# Patient Record
Sex: MALE | Race: WHITE | NOT HISPANIC OR LATINO | Employment: OTHER | URBAN - METROPOLITAN AREA
[De-identification: names, ages, dates, MRNs, and addresses within clinical notes are randomized per-mention and may not be internally consistent; named-entity substitution may affect disease eponyms.]

---

## 2017-12-19 RX ORDER — DABIGATRAN ETEXILATE MESYLATE 150 MG/1
CAPSULE ORAL
Refills: 3 | COMMUNITY
Start: 2017-11-13 | End: 2019-10-28

## 2017-12-19 RX ORDER — SOTALOL HYDROCHLORIDE 120 MG/1
TABLET ORAL
Refills: 1 | COMMUNITY
Start: 2017-11-13 | End: 2020-03-02 | Stop reason: SDUPTHER

## 2017-12-19 RX ORDER — LEVOCETIRIZINE DIHYDROCHLORIDE 5 MG/1
TABLET, FILM COATED ORAL
Refills: 1 | COMMUNITY
Start: 2017-11-13 | End: 2019-09-16

## 2017-12-19 RX ORDER — DABIGATRAN ETEXILATE MESYLATE 150 MG/1
CAPSULE ORAL
Qty: 60 CAPSULE | Refills: 3 | Status: CANCELLED | OUTPATIENT
Start: 2017-12-19

## 2017-12-19 RX ORDER — TAMSULOSIN HYDROCHLORIDE 0.4 MG/1
CAPSULE ORAL
Refills: 1 | COMMUNITY
Start: 2017-09-15

## 2017-12-19 RX ORDER — POLYETHYLENE GLYCOL 3350 17 G/17G
POWDER, FOR SOLUTION ORAL
Refills: 1 | COMMUNITY
Start: 2017-11-13 | End: 2019-09-16

## 2017-12-27 RX ORDER — DABIGATRAN ETEXILATE MESYLATE 150 MG/1
CAPSULE ORAL
Refills: 3 | Status: CANCELLED | OUTPATIENT
Start: 2017-12-27

## 2019-09-16 ENCOUNTER — OFFICE VISIT (OUTPATIENT)
Dept: CARDIOLOGY | Facility: CLINIC | Age: 69
End: 2019-09-16
Payer: MEDICARE

## 2019-09-16 VITALS
HEIGHT: 71 IN | WEIGHT: 270.06 LBS | BODY MASS INDEX: 37.81 KG/M2 | SYSTOLIC BLOOD PRESSURE: 138 MMHG | DIASTOLIC BLOOD PRESSURE: 84 MMHG | HEART RATE: 63 BPM

## 2019-09-16 DIAGNOSIS — I73.9 CLAUDICATION IN PERIPHERAL VASCULAR DISEASE: ICD-10-CM

## 2019-09-16 DIAGNOSIS — Z79.01 LONG TERM (CURRENT) USE OF ANTICOAGULANTS: ICD-10-CM

## 2019-09-16 DIAGNOSIS — Z79.899 LONG TERM CURRENT USE OF ANTIARRHYTHMIC DRUG: ICD-10-CM

## 2019-09-16 DIAGNOSIS — E66.01 SEVERE OBESITY (BMI 35.0-39.9) WITH COMORBIDITY: ICD-10-CM

## 2019-09-16 DIAGNOSIS — R60.0 LOWER LEG EDEMA: ICD-10-CM

## 2019-09-16 DIAGNOSIS — R06.02 SOB (SHORTNESS OF BREATH): ICD-10-CM

## 2019-09-16 DIAGNOSIS — I48.0 PAF (PAROXYSMAL ATRIAL FIBRILLATION): Primary | ICD-10-CM

## 2019-09-16 DIAGNOSIS — I10 ESSENTIAL HYPERTENSION: ICD-10-CM

## 2019-09-16 DIAGNOSIS — I34.0 NON-RHEUMATIC MITRAL REGURGITATION: ICD-10-CM

## 2019-09-16 PROCEDURE — 93000 ELECTROCARDIOGRAM COMPLETE: CPT | Mod: S$GLB,,, | Performed by: INTERNAL MEDICINE

## 2019-09-16 PROCEDURE — 99204 OFFICE O/P NEW MOD 45 MIN: CPT | Mod: S$GLB,,, | Performed by: INTERNAL MEDICINE

## 2019-09-16 PROCEDURE — 93000 EKG 12-LEAD: ICD-10-PCS | Mod: S$GLB,,, | Performed by: INTERNAL MEDICINE

## 2019-09-16 PROCEDURE — 99999 PR PBB SHADOW E&M-EST. PATIENT-LVL III: ICD-10-PCS | Mod: PBBFAC,,, | Performed by: INTERNAL MEDICINE

## 2019-09-16 PROCEDURE — 99204 PR OFFICE/OUTPT VISIT, NEW, LEVL IV, 45-59 MIN: ICD-10-PCS | Mod: S$GLB,,, | Performed by: INTERNAL MEDICINE

## 2019-09-16 PROCEDURE — 99999 PR PBB SHADOW E&M-EST. PATIENT-LVL III: CPT | Mod: PBBFAC,,, | Performed by: INTERNAL MEDICINE

## 2019-09-16 RX ORDER — FUROSEMIDE 20 MG/1
TABLET ORAL
Refills: 0 | COMMUNITY
Start: 2019-08-09 | End: 2019-09-16 | Stop reason: DRUGHIGH

## 2019-09-16 RX ORDER — POTASSIUM CHLORIDE 750 MG/1
10 TABLET, EXTENDED RELEASE ORAL DAILY
Qty: 30 TABLET | Refills: 1 | Status: SHIPPED | OUTPATIENT
Start: 2019-09-16 | End: 2019-09-16 | Stop reason: SDUPTHER

## 2019-09-16 RX ORDER — FUROSEMIDE 40 MG/1
40 TABLET ORAL DAILY
Qty: 30 TABLET | Refills: 1 | Status: SHIPPED | OUTPATIENT
Start: 2019-09-16 | End: 2019-09-16 | Stop reason: SDUPTHER

## 2019-09-16 RX ORDER — POTASSIUM CHLORIDE 750 MG/1
TABLET, EXTENDED RELEASE ORAL
Qty: 90 TABLET | Refills: 0 | Status: SHIPPED | OUTPATIENT
Start: 2019-09-16 | End: 2019-10-28 | Stop reason: DRUGHIGH

## 2019-09-16 RX ORDER — CLOTRIMAZOLE 1 %
CREAM (GRAM) TOPICAL
Refills: 2 | COMMUNITY
Start: 2019-08-09 | End: 2022-06-02

## 2019-09-16 RX ORDER — FUROSEMIDE 40 MG/1
TABLET ORAL
Qty: 90 TABLET | Refills: 0 | Status: SHIPPED | OUTPATIENT
Start: 2019-09-16 | End: 2020-03-02 | Stop reason: SDUPTHER

## 2019-09-16 RX ORDER — CLINDAMYCIN HYDROCHLORIDE 300 MG/1
CAPSULE ORAL
Refills: 0 | COMMUNITY
Start: 2019-09-11 | End: 2022-06-02

## 2019-09-16 NOTE — PROGRESS NOTES
Subjective:    Patient ID:  Yonathan Hernandez Sr. is a 68 y.o. male who presents for Atrial Fibrillation; Hypertension; and Edema        Atrial Fibrillation   Presents for follow-up visit. Symptoms include hypertension and shortness of breath. Symptoms are negative for chest pain, dizziness, palpitations, syncope and weakness. The symptoms have been stable. Past medical history includes atrial fibrillation.   Hypertension   This is a chronic problem. The current episode started more than 1 year ago. The problem is controlled. Associated symptoms include shortness of breath. Pertinent negatives include no blurred vision, chest pain, malaise/fatigue, orthopnea, palpitations or PND. Risk factors for coronary artery disease include male gender and obesity. Past treatments include diuretics.   Edema   This is a recurrent problem. The current episode started more than 1 month ago. The problem has been waxing and waning. Pertinent negatives include no abdominal pain, change in bowel habit, chest pain, chills, congestion, coughing, diaphoresis, fever, numbness, vomiting or weakness. He has tried walking for the symptoms. The treatment provided mild relief.   Shortness of Breath   This is a chronic problem. The current episode started more than 1 year ago. Associated symptoms include claudication and leg swelling. Pertinent negatives include no abdominal pain, chest pain, fever, hemoptysis, orthopnea, PND, syncope, vomiting or wheezing. The symptoms are aggravated by exercise. He has tried rest for the symptoms. The treatment provided mild relief.       NEW PT EVALUATION, PAF, HAD VENOUS DOPPLER FOR EDEMA, WAS OK,HAS BEEN TREATED FOR CELLULITIS, FOR A WHILE , 7/30/2019 ,HAS BEEN WEARING STOCKINGS, WAS FOLLOWED BY DR AWAD,  SEE ROS  Past Medical History:   Diagnosis Date    Edema      Past Surgical History:   Procedure Laterality Date    CARDIAC CATHETERIZATION       Family History   Problem Relation Age of Onset     Lung cancer Father      Social History     Socioeconomic History    Marital status:      Spouse name: Not on file    Number of children: Not on file    Years of education: Not on file    Highest education level: Not on file   Occupational History    Not on file   Social Needs    Financial resource strain: Not on file    Food insecurity:     Worry: Not on file     Inability: Not on file    Transportation needs:     Medical: Not on file     Non-medical: Not on file   Tobacco Use    Smoking status: Never Smoker    Smokeless tobacco: Never Used   Substance and Sexual Activity    Alcohol use: Not Currently    Drug use: Not Currently    Sexual activity: Not on file   Lifestyle    Physical activity:     Days per week: Not on file     Minutes per session: Not on file    Stress: Not on file   Relationships    Social connections:     Talks on phone: Not on file     Gets together: Not on file     Attends Tenriism service: Not on file     Active member of club or organization: Not on file     Attends meetings of clubs or organizations: Not on file     Relationship status: Not on file   Other Topics Concern    Not on file   Social History Narrative    Not on file       Review of patient's allergies indicates:   Allergen Reactions    Lidocaine hcl        Current Outpatient Medications:     clindamycin (CLEOCIN) 300 MG capsule, TK ONE C PO BID FOR 14 DAYS, Disp: , Rfl: 0    clotrimazole (LOTRIMIN) 1 % cream, , Disp: , Rfl: 2    PRADAXA 150 mg Cap, TK ONE C PO ONCE A DAY, Disp: , Rfl: 3    sotalol (BETAPACE) 120 MG Tab, TK 1 T PO Q 12 H, Disp: , Rfl: 1    tamsulosin (FLOMAX) 0.4 mg Cp24, TK 1 C PO QD, Disp: , Rfl: 1    furosemide (LASIX) 40 MG tablet, TAKE 1 TABLET(40 MG) BY MOUTH EVERY DAY, Disp: 90 tablet, Rfl: 0    potassium chloride SA (K-DUR,KLOR-CON) 10 MEQ tablet, TAKE 1 TABLET(10 MEQ) BY MOUTH EVERY DAY, Disp: 90 tablet, Rfl: 0    Review of Systems   Constitution: Negative for chills,  "diaphoresis, fever, malaise/fatigue, night sweats and weight gain.   HENT: Negative for congestion, hearing loss and nosebleeds.    Eyes: Negative for blurred vision and visual disturbance.   Cardiovascular: Positive for claudication, dyspnea on exertion and leg swelling. Negative for chest pain, cyanosis, irregular heartbeat, near-syncope, orthopnea, palpitations, paroxysmal nocturnal dyspnea and syncope.   Respiratory: Positive for shortness of breath. Negative for cough, hemoptysis and wheezing.    Endocrine: Negative for cold intolerance, heat intolerance and polyuria.   Hematologic/Lymphatic: Negative for adenopathy. Does not bruise/bleed easily.   Skin: Negative for color change, itching and nail changes.   Musculoskeletal: Negative for back pain (CHRONIC, HAD INJECTION, DR VALERIO), falls, joint pain and stiffness.   Gastrointestinal: Negative for abdominal pain, change in bowel habit, dysphagia, flatus, heartburn, hematemesis, jaundice, melena and vomiting.   Genitourinary: Negative for dysuria, flank pain and frequency.   Neurological: Negative for brief paralysis, dizziness, focal weakness, light-headedness, loss of balance, numbness, paresthesias, seizures, sensory change, tremors and weakness.   Psychiatric/Behavioral: Negative for altered mental status, depression, memory loss and substance abuse. The patient is not nervous/anxious.    Allergic/Immunologic: Negative for hives and persistent infections.        Objective:      Vitals:    09/16/19 1510   BP: 138/84   Pulse: 63   Weight: 122.5 kg (270 lb 1 oz)   Height: 5' 11" (1.803 m)     Body mass index is 37.67 kg/m².    Physical Exam   Constitutional: He is oriented to person, place, and time. He appears well-developed and well-nourished. He is active.   HENT:   Head: Normocephalic and atraumatic.   Mouth/Throat: Oropharynx is clear and moist and mucous membranes are normal.   Eyes: Pupils are equal, round, and reactive to light. Conjunctivae and EOM " are normal.   Neck: Normal range of motion. Neck supple. Normal carotid pulses, no hepatojugular reflux and no JVD present. Carotid bruit is not present. No tracheal deviation, no edema and no erythema present. No thyromegaly present.   Cardiovascular: Normal rate, regular rhythm and normal heart sounds.  No extrasystoles are present. PMI is not displaced. Exam reveals no gallop, no distant heart sounds, no friction rub and no midsystolic click.   No murmur heard.  Pulses:       Carotid pulses are 2+ on the right side, and 2+ on the left side.       Radial pulses are 2+ on the right side, and 2+ on the left side.        Femoral pulses are 2+ on the right side, and 2+ on the left side.       Popliteal pulses are 2+ on the right side, and 2+ on the left side.        Dorsalis pedis pulses are 2+ on the right side, and 2+ on the left side.        Posterior tibial pulses are 1+ on the right side, and 1+ on the left side.   Pulmonary/Chest: Effort normal and breath sounds normal. No accessory muscle usage. No tachypnea and no bradypnea. No respiratory distress.   Abdominal: Soft. Bowel sounds are normal. He exhibits no distension and no mass. There is no hepatosplenomegaly. There is no CVA tenderness.   Musculoskeletal: Normal range of motion. He exhibits no edema or deformity.   +1 EDEMA R> L WITH MILD ERYTHEMA   Lymphadenopathy:     He has no cervical adenopathy.   Neurological: He is alert and oriented to person, place, and time. He has normal strength. He displays no tremor. No cranial nerve deficit.   Skin: Skin is warm and dry. No cyanosis or erythema. No pallor.   Psychiatric: He has a normal mood and affect. His speech is normal and behavior is normal.               ..    Chemistry    No results found for: NA, K, CL, CO2, BUN, CREATININE, GLU No results found for: CALCIUM, ALKPHOS, AST, ALT, BILITOT, ESTGFRAFRICA, EGFRNONAA         ..No results found for: CHOL  No results found for: HDL  No results found for:  LDLCALC  No results found for: TRIG  No results found for: CHOLHDL  ..No results found for: WBC, HGB, HCT, MCV, PLT    Test(s) Reviewed  I have reviewed the following in detail:  [] Stress test   [] Angiography   [] Echocardiogram   [] Labs   [x] Other:       Assessment:         ICD-10-CM ICD-9-CM   1. PAF (paroxysmal atrial fibrillation) I48.0 427.31   2. Lower leg edema R60.0 782.3   3. SOB (shortness of breath) R06.02 786.05   4. Long term (current) use of anticoagulants Z79.01 V58.61   5. Long term current use of antiarrhythmic drug Z79.899 V58.69   6. Essential hypertension I10 401.9   7. Claudication in peripheral vascular disease I73.9 443.9   8. Severe obesity (BMI 35.0-39.9) with comorbidity E66.01 278.01   9. Non-rheumatic mitral regurgitation I34.0 424.0     Problem List Items Addressed This Visit        Cardiac/Vascular    PAF (paroxysmal atrial fibrillation) - Primary    Relevant Orders    Nuclear Stress - Cardiology Interpreted    Echo    Basic metabolic panel    Essential hypertension    Relevant Orders    Nuclear Stress - Cardiology Interpreted    Basic metabolic panel    Claudication in peripheral vascular disease    Relevant Orders    Nuclear Stress - Cardiology Interpreted    CV Ultrasound doppler arterial legs bilat    Basic metabolic panel    Non-rheumatic mitral regurgitation    Relevant Orders    Nuclear Stress - Cardiology Interpreted    Echo    Basic metabolic panel       Hematology    Long term (current) use of anticoagulants    Relevant Orders    Nuclear Stress - Cardiology Interpreted    Basic metabolic panel       Endocrine    Severe obesity (BMI 35.0-39.9) with comorbidity    Relevant Orders    Nuclear Stress - Cardiology Interpreted    Basic metabolic panel       Other    Lower leg edema    Relevant Orders    Nuclear Stress - Cardiology Interpreted    Basic metabolic panel    Long term current use of antiarrhythmic drug    Relevant Orders    Nuclear Stress - Cardiology Interpreted     Basic metabolic panel    SOB (shortness of breath)    Relevant Orders    Nuclear Stress - Cardiology Interpreted    Echo    Basic metabolic panel           Plan:     EKG SR, WNL, GET VENOUS DOPPLER AND LABS FROM PCP, , WILL NEED EVALUATION BY CHECK WHEN CLEARED STRESS EDGE ARTERIAL DOPPLER , INCREASE LASIX TO 40 MG AND ADD POTASSIUM SUPPLEMENT, CHECK BMP ON MEDICATIONS.ALL OTHER CV CLINICALLY STABLE,  NO HF, NO TIA, NO CLINICAL ARRHYTHMIA,CONTINUE CURRENT MEDS, EDUCATION, DIET, EXERCISE, WEIGHT LOSS, RETURN TO CLINIC IN 2 MONTHS, EXPLAINED PLAN TO THE PATIENT DETAILS      PAF (paroxysmal atrial fibrillation)  -     Nuclear Stress - Cardiology Interpreted; Future  -     Echo; Future  -     Basic metabolic panel; Future; Expected date: 09/16/2019    Lower leg edema  -     Nuclear Stress - Cardiology Interpreted; Future  -     Basic metabolic panel; Future; Expected date: 09/16/2019    SOB (shortness of breath)  -     Nuclear Stress - Cardiology Interpreted; Future  -     Echo; Future  -     Basic metabolic panel; Future; Expected date: 09/16/2019    Long term (current) use of anticoagulants  -     Nuclear Stress - Cardiology Interpreted; Future  -     Basic metabolic panel; Future; Expected date: 09/16/2019    Long term current use of antiarrhythmic drug  -     Nuclear Stress - Cardiology Interpreted; Future  -     Basic metabolic panel; Future; Expected date: 09/16/2019    Essential hypertension  -     Nuclear Stress - Cardiology Interpreted; Future  -     Basic metabolic panel; Future; Expected date: 09/16/2019  -     SCHEDULED EKG 12-LEAD (to Muse)    Claudication in peripheral vascular disease  -     Nuclear Stress - Cardiology Interpreted; Future  -     CV Ultrasound doppler arterial legs bilat; Future  -     Basic metabolic panel; Future; Expected date: 09/16/2019    Severe obesity (BMI 35.0-39.9) with comorbidity  -     Nuclear Stress - Cardiology Interpreted; Future  -     Basic metabolic panel; Future;  Expected date: 09/16/2019    Non-rheumatic mitral regurgitation  -     Nuclear Stress - Cardiology Interpreted; Future  -     Echo; Future  -     Basic metabolic panel; Future; Expected date: 09/16/2019    Other orders  -     Discontinue: furosemide (LASIX) 40 MG tablet; Take 1 tablet (40 mg total) by mouth once daily.  Dispense: 30 tablet; Refill: 1  -     Discontinue: potassium chloride SA (K-DUR,KLOR-CON) 10 MEQ tablet; Take 1 tablet (10 mEq total) by mouth once daily.  Dispense: 30 tablet; Refill: 1    RTC Low level/low impact aerobic exercise 5x's/wk. Heart healthy diet and risk factor modification.    See labs and med orders.    Aerobic exercise 5x's/wk. Heart healthy diet and risk factor modification.    See labs and med orders.

## 2019-09-17 DIAGNOSIS — I10 ESSENTIAL HYPERTENSION: Primary | ICD-10-CM

## 2019-09-19 ENCOUNTER — HOSPITAL ENCOUNTER (OUTPATIENT)
Dept: RADIOLOGY | Facility: HOSPITAL | Age: 69
Discharge: HOME | End: 2019-09-19
Attending: INTERNAL MEDICINE
Payer: MEDICARE

## 2019-09-19 ENCOUNTER — CLINICAL SUPPORT (OUTPATIENT)
Dept: CARDIOLOGY | Facility: CLINIC | Age: 69
End: 2019-09-19
Attending: INTERNAL MEDICINE
Payer: MEDICARE

## 2019-09-19 VITALS — BODY MASS INDEX: 37.8 KG/M2 | HEIGHT: 71 IN | WEIGHT: 270 LBS

## 2019-09-19 DIAGNOSIS — I34.0 NON-RHEUMATIC MITRAL REGURGITATION: ICD-10-CM

## 2019-09-19 DIAGNOSIS — Z79.01 LONG TERM (CURRENT) USE OF ANTICOAGULANTS: ICD-10-CM

## 2019-09-19 DIAGNOSIS — R60.0 LOWER LEG EDEMA: ICD-10-CM

## 2019-09-19 DIAGNOSIS — Z79.899 LONG TERM CURRENT USE OF ANTIARRHYTHMIC DRUG: ICD-10-CM

## 2019-09-19 DIAGNOSIS — E66.01 SEVERE OBESITY (BMI 35.0-39.9) WITH COMORBIDITY: ICD-10-CM

## 2019-09-19 DIAGNOSIS — I10 ESSENTIAL HYPERTENSION: ICD-10-CM

## 2019-09-19 DIAGNOSIS — I48.0 PAF (PAROXYSMAL ATRIAL FIBRILLATION): ICD-10-CM

## 2019-09-19 DIAGNOSIS — R06.02 SOB (SHORTNESS OF BREATH): ICD-10-CM

## 2019-09-19 DIAGNOSIS — I73.9 CLAUDICATION IN PERIPHERAL VASCULAR DISEASE: ICD-10-CM

## 2019-09-19 LAB
CV STRESS BASE HR: 61 BPM
DIASTOLIC BLOOD PRESSURE: 79 MMHG
NUC REST EJECTION FRACTION: 67
NUC STRESS EJECTION FRACTION: 68 %
OHS CV CPX 1 MINUTE RECOVERY HEART RATE: 86 BPM
OHS CV CPX 85 PERCENT MAX PREDICTED HEART RATE MALE: 128
OHS CV CPX MAX PREDICTED HEART RATE: 150
OHS CV CPX PATIENT IS FEMALE: 0
OHS CV CPX PATIENT IS MALE: 1
OHS CV CPX PEAK DIASTOLIC BLOOD PRESSURE: 79 MMHG
OHS CV CPX PEAK HEAR RATE: 88 BPM
OHS CV CPX PEAK RATE PRESSURE PRODUCT: NORMAL
OHS CV CPX PEAK SYSTOLIC BLOOD PRESSURE: 144 MMHG
OHS CV CPX PERCENT MAX PREDICTED HEART RATE ACHIEVED: 59
OHS CV CPX RATE PRESSURE PRODUCT PRESENTING: 8784
STRESS ECHO TARGET HR: 127.5 BPM
SYSTOLIC BLOOD PRESSURE: 144 MMHG

## 2019-09-19 PROCEDURE — 99999 PR PBB SHADOW E&M-EST. PATIENT-LVL I: ICD-10-PCS | Mod: PBBFAC,,,

## 2019-09-19 PROCEDURE — 93016 CV STRESS TEST SUPVJ ONLY: CPT | Mod: ,,, | Performed by: INTERNAL MEDICINE

## 2019-09-19 PROCEDURE — 78452 HT MUSCLE IMAGE SPECT MULT: CPT | Mod: 26,,, | Performed by: INTERNAL MEDICINE

## 2019-09-19 PROCEDURE — 93016 STRESS TEST WITH MYOCARDIAL PERFUSION (CUPID ONLY): ICD-10-PCS | Mod: ,,, | Performed by: INTERNAL MEDICINE

## 2019-09-19 PROCEDURE — 78452 STRESS TEST WITH MYOCARDIAL PERFUSION (CUPID ONLY): ICD-10-PCS | Mod: 26,,, | Performed by: INTERNAL MEDICINE

## 2019-09-19 PROCEDURE — 99999 PR PBB SHADOW E&M-EST. PATIENT-LVL I: CPT | Mod: PBBFAC,,,

## 2019-09-19 PROCEDURE — 93018 PR CARDIAC STRESS TST,INTERP/REPT ONLY: ICD-10-PCS | Mod: ICN,,, | Performed by: INTERNAL MEDICINE

## 2019-09-19 PROCEDURE — 93018 CV STRESS TEST I&R ONLY: CPT | Mod: ICN,,, | Performed by: INTERNAL MEDICINE

## 2019-09-23 ENCOUNTER — TELEPHONE (OUTPATIENT)
Dept: CARDIOLOGY | Facility: CLINIC | Age: 69
End: 2019-09-23

## 2019-09-24 ENCOUNTER — TELEPHONE (OUTPATIENT)
Dept: CARDIOLOGY | Facility: CLINIC | Age: 69
End: 2019-09-24

## 2019-09-24 DIAGNOSIS — R94.39 ABNORMAL STRESS TEST: Primary | ICD-10-CM

## 2019-09-24 NOTE — TELEPHONE ENCOUNTER
----- Message from Mica Tafoya MD sent at 9/23/2019  7:00 PM CDT -----  Abnormal nuclear stress test, for cardiac catheterization if it could be done on October 1st or 3rd or after he comes back from vacation of T6 we do have openings and patient can move from the 1st, he is also scheduled for an echo on the on September 26 should be just echo not stress echo

## 2019-09-26 ENCOUNTER — CLINICAL SUPPORT (OUTPATIENT)
Dept: CARDIOLOGY | Facility: CLINIC | Age: 69
End: 2019-09-26
Attending: INTERNAL MEDICINE
Payer: MEDICARE

## 2019-09-26 VITALS
DIASTOLIC BLOOD PRESSURE: 66 MMHG | SYSTOLIC BLOOD PRESSURE: 140 MMHG | WEIGHT: 265 LBS | BODY MASS INDEX: 37.1 KG/M2 | HEIGHT: 71 IN | HEART RATE: 71 BPM

## 2019-09-26 DIAGNOSIS — I48.0 PAF (PAROXYSMAL ATRIAL FIBRILLATION): ICD-10-CM

## 2019-09-26 DIAGNOSIS — I73.9 CLAUDICATION IN PERIPHERAL VASCULAR DISEASE: ICD-10-CM

## 2019-09-26 DIAGNOSIS — R06.02 SOB (SHORTNESS OF BREATH): ICD-10-CM

## 2019-09-26 DIAGNOSIS — I34.0 NON-RHEUMATIC MITRAL REGURGITATION: ICD-10-CM

## 2019-09-26 LAB
ASCENDING AORTA: 4.04 CM
AV INDEX (PROSTH): 0.95
AV MEAN GRADIENT: 3 MMHG
AV PEAK GRADIENT: 7 MMHG
AV VALVE AREA: 3.75 CM2
AV VELOCITY RATIO: 0.96
BSA FOR ECHO PROCEDURE: 2.45 M2
CV ECHO LV RWT: 0.44 CM
DOP CALC AO PEAK VEL: 1.29 M/S
DOP CALC AO VTI: 26.02 CM
DOP CALC LVOT AREA: 3.9 CM2
DOP CALC LVOT DIAMETER: 2.24 CM
DOP CALC LVOT PEAK VEL: 1.24 M/S
DOP CALC LVOT STROKE VOLUME: 97.68 CM3
DOP CALCLVOT PEAK VEL VTI: 24.8 CM
E WAVE DECELERATION TIME: 174.84 MSEC
E/A RATIO: 1.1
E/E' RATIO: 6.7 M/S
ECHO LV POSTERIOR WALL: 1.07 CM (ref 0.6–1.1)
FRACTIONAL SHORTENING: 28 % (ref 28–44)
INTERVENTRICULAR SEPTUM: 1.19 CM (ref 0.6–1.1)
LA MAJOR: 4.65 CM
LA MINOR: 4.03 CM
LA WIDTH: 4.22 CM
LEFT ANT TIBIAL SYS PSV: 58 CM/S
LEFT ATRIUM SIZE: 4.04 CM
LEFT ATRIUM VOLUME INDEX: 26.3 ML/M2
LEFT ATRIUM VOLUME: 62.57 CM3
LEFT CFA PSV: 94 CM/S
LEFT EXTERNAL ILIAC PSV: 77 CM/S
LEFT INTERNAL DIMENSION IN SYSTOLE: 3.5 CM (ref 2.1–4)
LEFT POPLITEAL PSV: 61 CM/S
LEFT PROFUNDA SYS PSV: 79 CM/S
LEFT SUPER FEMORAL DIST SYS PSV: 98 CM/S
LEFT SUPER FEMORAL MID SYS PSV: 60 CM/S
LEFT SUPER FEMORAL OSTIAL SYS PSV: 83 CM/S
LEFT SUPER FEMORAL PROX SYS PSV: 99 CM/S
LEFT VENTRICLE DIASTOLIC VOLUME INDEX: 47.24 ML/M2
LEFT VENTRICLE DIASTOLIC VOLUME: 112.29 ML
LEFT VENTRICLE MASS INDEX: 87 G/M2
LEFT VENTRICLE SYSTOLIC VOLUME INDEX: 21.3 ML/M2
LEFT VENTRICLE SYSTOLIC VOLUME: 50.74 ML
LEFT VENTRICULAR INTERNAL DIMENSION IN DIASTOLE: 4.89 CM (ref 3.5–6)
LEFT VENTRICULAR MASS: 207.43 G
LV LATERAL E/E' RATIO: 6.09 M/S
LV SEPTAL E/E' RATIO: 7.44 M/S
MV PEAK A VEL: 0.61 M/S
MV PEAK E VEL: 0.67 M/S
OHS CV LEFT COMMON ILIAC ARTERY PSV: 76 CM/S
OHS CV LEFT LOWER EXTREMITY ABI (NO CALC): 1.04
OHS CV RIGHT ABI LOWER EXTREMITY (NO CALC): 1
PISA TR MAX VEL: 2.44 M/S
PULM VEIN S/D RATIO: 1.11
PV PEAK D VEL: 0.44 M/S
PV PEAK S VEL: 0.49 M/S
RA MAJOR: 4.48 CM
RA PRESSURE: 3 MMHG
RA WIDTH: 3.78 CM
RIGHT ANT TIBIAL SYS PSV: 47 CM/S
RIGHT CFA PSV: 114 CM/S
RIGHT PERONEAL SYS PSV: 103 CM/S
RIGHT POPLITEAL PSV: 98 CM/S
RIGHT POST TIBIAL SYS PSV: 109 CM/S
RIGHT PROFUNDA SYS PSV: 88 CM/S
RIGHT SUPER FEMORAL DIST SYS PSV: 77 CM/S
RIGHT SUPER FEMORAL MID SYS PSV: 79 CM/S
RIGHT SUPER FEMORAL OSTIAL SYS PSV: 83 CM/S
RIGHT SUPER FEMORAL PROX SYS PSV: 118 CM/S
RIGHT TIB/PER TRUNK SYS PSV: 97 CM/S
RV TISSUE DOPPLER FREE WALL SYSTOLIC VELOCITY 1 (APICAL 4 CHAMBER VIEW): 16.41 CM/S
SINUS: 3.8 CM
STJ: 3.76 CM
TDI LATERAL: 0.11 M/S
TDI SEPTAL: 0.09 M/S
TDI: 0.1 M/S
TR MAX PG: 24 MMHG
TRICUSPID ANNULAR PLANE SYSTOLIC EXCURSION: 3.08 CM
TV REST PULMONARY ARTERY PRESSURE: 27 MMHG

## 2019-09-26 PROCEDURE — 93925 CV US DOPPLER ARTERIAL LEGS BILATERAL (CUPID ONLY): ICD-10-PCS | Mod: S$GLB,,, | Performed by: INTERNAL MEDICINE

## 2019-09-26 PROCEDURE — 93306 ECHO (CUPID ONLY): ICD-10-PCS | Mod: S$GLB,,, | Performed by: INTERNAL MEDICINE

## 2019-09-26 PROCEDURE — 99999 PR PBB SHADOW E&M-EST. PATIENT-LVL II: ICD-10-PCS | Mod: PBBFAC,,,

## 2019-09-26 PROCEDURE — 99999 PR PBB SHADOW E&M-EST. PATIENT-LVL II: CPT | Mod: PBBFAC,,,

## 2019-09-26 PROCEDURE — 93925 LOWER EXTREMITY STUDY: CPT | Mod: S$GLB,,, | Performed by: INTERNAL MEDICINE

## 2019-09-26 PROCEDURE — 93306 TTE W/DOPPLER COMPLETE: CPT | Mod: S$GLB,,, | Performed by: INTERNAL MEDICINE

## 2019-09-29 ENCOUNTER — TELEPHONE (OUTPATIENT)
Dept: CARDIOLOGY | Facility: CLINIC | Age: 69
End: 2019-09-29

## 2019-10-01 PROBLEM — R94.39 ABNORMAL STRESS TEST: Status: ACTIVE | Noted: 2019-10-01

## 2019-10-28 ENCOUNTER — OFFICE VISIT (OUTPATIENT)
Dept: CARDIOLOGY | Facility: CLINIC | Age: 69
End: 2019-10-28
Payer: MEDICARE

## 2019-10-28 VITALS
HEIGHT: 71 IN | SYSTOLIC BLOOD PRESSURE: 127 MMHG | DIASTOLIC BLOOD PRESSURE: 75 MMHG | HEART RATE: 77 BPM | BODY MASS INDEX: 37.53 KG/M2 | WEIGHT: 268.06 LBS

## 2019-10-28 DIAGNOSIS — I48.0 PAF (PAROXYSMAL ATRIAL FIBRILLATION): ICD-10-CM

## 2019-10-28 DIAGNOSIS — E78.00 HYPERCHOLESTEREMIA: ICD-10-CM

## 2019-10-28 DIAGNOSIS — Z79.899 LONG TERM CURRENT USE OF ANTIARRHYTHMIC DRUG: ICD-10-CM

## 2019-10-28 DIAGNOSIS — E66.01 SEVERE OBESITY (BMI 35.0-39.9) WITH COMORBIDITY: ICD-10-CM

## 2019-10-28 DIAGNOSIS — R94.30 ELEVATED LEFT VENTRICULAR END-DIASTOLIC PRESSURE (LVEDP): Primary | ICD-10-CM

## 2019-10-28 DIAGNOSIS — I10 ESSENTIAL HYPERTENSION: ICD-10-CM

## 2019-10-28 DIAGNOSIS — I25.10 MILD CAD: ICD-10-CM

## 2019-10-28 PROCEDURE — 99214 OFFICE O/P EST MOD 30 MIN: CPT | Mod: S$GLB,,, | Performed by: INTERNAL MEDICINE

## 2019-10-28 PROCEDURE — 99999 PR PBB SHADOW E&M-EST. PATIENT-LVL III: ICD-10-PCS | Mod: PBBFAC,,, | Performed by: INTERNAL MEDICINE

## 2019-10-28 PROCEDURE — 1101F PR PT FALLS ASSESS DOC 0-1 FALLS W/OUT INJ PAST YR: ICD-10-PCS | Mod: CPTII,S$GLB,, | Performed by: INTERNAL MEDICINE

## 2019-10-28 PROCEDURE — 99999 PR PBB SHADOW E&M-EST. PATIENT-LVL III: CPT | Mod: PBBFAC,,, | Performed by: INTERNAL MEDICINE

## 2019-10-28 PROCEDURE — 1101F PT FALLS ASSESS-DOCD LE1/YR: CPT | Mod: CPTII,S$GLB,, | Performed by: INTERNAL MEDICINE

## 2019-10-28 PROCEDURE — 99214 PR OFFICE/OUTPT VISIT, EST, LEVL IV, 30-39 MIN: ICD-10-PCS | Mod: S$GLB,,, | Performed by: INTERNAL MEDICINE

## 2019-10-28 RX ORDER — PRAVASTATIN SODIUM 10 MG/1
10 TABLET ORAL DAILY
Qty: 90 TABLET | Refills: 0 | Status: SHIPPED | OUTPATIENT
Start: 2019-10-28 | End: 2020-06-08 | Stop reason: SDUPTHER

## 2019-10-28 RX ORDER — SPIRONOLACTONE 25 MG/1
12.5 TABLET ORAL DAILY
Qty: 45 TABLET | Refills: 0 | Status: SHIPPED | OUTPATIENT
Start: 2019-10-28 | End: 2024-03-08

## 2019-10-28 RX ORDER — POTASSIUM CHLORIDE 750 MG/1
10 TABLET, EXTENDED RELEASE ORAL EVERY OTHER DAY
Qty: 90 TABLET | Refills: 0 | Status: SHIPPED | OUTPATIENT
Start: 2019-10-28 | End: 2020-07-16

## 2019-10-28 NOTE — PROGRESS NOTES
Subjective:    Patient ID:  Yonathan Hernandez Sr. is a 69 y.o. male who presents for Foot Swelling ([facility]); Hypertension; and Coronary Artery Disease        HPI  S/P CATH, DISCUSSED FINDINGS MILD CAD, NORMAL FFR OF THE LAD, URI SX, C/O COST OF PRADAXA, TAKES ONE/D,.  NO CHEST PAIN, NO TIA TYPE SYMPTOMS, NO NEAR-SYNCOPE, SYNCOPE NO SIGNIFICANT PALPITATIONS, SEE ROS    Past Medical History:   Diagnosis Date    Atrial fibrillation     Edema     Enlarged prostate      Past Surgical History:   Procedure Laterality Date    ANGIOGRAM, CORONARY, WITH LEFT HEART CATHETERIZATION  10/1/2019    Procedure: Angiogram, Coronary, with Left Heart Cath;  Surgeon: Mica Tafoya MD;  Location: [facility] CATH;  Service: Cardiology;;    CARDIAC CATHETERIZATION       Family History   Problem Relation Age of Onset    Lung cancer Father      Social History     Socioeconomic History    Marital status:      Spouse name: Not on file    Number of children: Not on file    Years of education: Not on file    Highest education level: Not on file   Occupational History    Not on file   Social Needs    Financial resource strain: Not on file    Food insecurity:     Worry: Not on file     Inability: Not on file    Transportation needs:     Medical: Not on file     Non-medical: Not on file   Tobacco Use    Smoking status: Never Smoker    Smokeless tobacco: Never Used   Substance and Sexual Activity    Alcohol use: Not Currently    Drug use: Not Currently    Sexual activity: Not on file   Lifestyle    Physical activity:     Days per week: Not on file     Minutes per session: Not on file    Stress: Not on file   Relationships    Social connections:     Talks on phone: Not on file     Gets together: Not on file     Attends Baptism service: Not on file     Active member of club or organization: Not on file     Attends meetings of clubs or organizations: Not on file     Relationship status: Not on file   Other Topics Concern     Not on file   Social History Narrative    Not on file       Review of patient's allergies indicates:   Allergen Reactions    Lidocaine hcl        Current Outpatient Medications:     clotrimazole (LOTRIMIN) 1 % cream, , Disp: , Rfl: 2    furosemide (LASIX) 40 MG tablet, TAKE 1 TABLET(40 MG) BY MOUTH EVERY DAY, Disp: 90 tablet, Rfl: 0    potassium chloride SA (K-DUR,KLOR-CON) 10 MEQ tablet, Take 1 tablet (10 mEq total) by mouth every other day., Disp: 90 tablet, Rfl: 0    sotalol (BETAPACE) 120 MG Tab, TK 1 T PO Q 12 H, Disp: , Rfl: 1    tamsulosin (FLOMAX) 0.4 mg Cp24, TK 1 C PO QD, Disp: , Rfl: 1    clindamycin (CLEOCIN) 300 MG capsule, TK ONE C PO BID FOR 14 DAYS, Disp: , Rfl: 0    pravastatin (PRAVACHOL) 10 MG tablet, Take 1 tablet (10 mg total) by mouth once daily., Disp: 90 tablet, Rfl: 0    rivaroxaban (XARELTO) 20 mg Tab, Take 1 tablet (20 mg total) by mouth daily with dinner or evening meal., Disp: 90 tablet, Rfl: 0    spironolactone (ALDACTONE) 25 MG tablet, Take 0.5 tablets (12.5 mg total) by mouth once daily., Disp: 45 tablet, Rfl: 0    Review of Systems   Constitution: Negative for chills, diaphoresis, fever, malaise/fatigue and night sweats. Weight loss: SOME.   HENT: Negative for congestion, hearing loss and nosebleeds.    Eyes: Negative for blurred vision and visual disturbance.   Cardiovascular: Negative for chest pain, claudication, cyanosis, irregular heartbeat, leg swelling (MILD/L), near-syncope, orthopnea, palpitations, paroxysmal nocturnal dyspnea and syncope. Dyspnea on exertion: MILD.   Respiratory: Negative for cough, hemoptysis, shortness of breath and wheezing.    Endocrine: Negative for cold intolerance, heat intolerance and polyuria.   Hematologic/Lymphatic: Negative for adenopathy. Does not bruise/bleed easily.   Skin: Negative for color change, itching and nail changes.   Musculoskeletal: Negative for back pain (CHRONIC, HAD INJECTION, DR VALERIO), falls, joint pain and  "stiffness.   Gastrointestinal: Negative for abdominal pain, change in bowel habit, dysphagia, flatus, heartburn, hematemesis, jaundice, melena and vomiting.   Genitourinary: Negative for dysuria, flank pain and frequency.   Neurological: Negative for brief paralysis, dizziness, focal weakness, light-headedness, loss of balance, numbness, paresthesias, seizures, sensory change, tremors and weakness.   Psychiatric/Behavioral: Negative for altered mental status, depression, memory loss and substance abuse. The patient is not nervous/anxious.    Allergic/Immunologic: Negative for hives and persistent infections.        Objective:      Vitals:    10/28/19 1001   BP: 127/75   Pulse: 77   Weight: 121.6 kg (268 lb 1.3 oz)   Height: 5' 11" (1.803 m)   PainSc:   4     Body mass index is 37.39 kg/m².    Physical Exam   Constitutional: He is oriented to person, place, and time. He appears well-developed and well-nourished. He is active.   HENT:   Head: Normocephalic and atraumatic.   Mouth/Throat: Oropharynx is clear and moist and mucous membranes are normal.   Eyes: Pupils are equal, round, and reactive to light. Conjunctivae and EOM are normal.   Neck: Normal range of motion. Neck supple. Normal carotid pulses, no hepatojugular reflux and no JVD present. Carotid bruit is not present. No tracheal deviation, no edema and no erythema present. No thyromegaly present.   Cardiovascular: Normal rate, regular rhythm and normal heart sounds.  No extrasystoles are present. PMI is not displaced. Exam reveals no gallop, no distant heart sounds, no friction rub and no midsystolic click.   No murmur heard.  Pulses:       Carotid pulses are 2+ on the right side, and 2+ on the left side.       Radial pulses are 2+ on the right side, and 2+ on the left side.        Femoral pulses are 2+ on the right side, and 2+ on the left side.       Popliteal pulses are 2+ on the right side, and 2+ on the left side.        Dorsalis pedis pulses are 2+ on " the right side, and 2+ on the left side.        Posterior tibial pulses are 1+ on the right side, and 1+ on the left side.   Pulmonary/Chest: Effort normal and breath sounds normal. No accessory muscle usage. No tachypnea and no bradypnea. No respiratory distress.   Abdominal: Soft. Bowel sounds are normal. He exhibits no distension and no mass. There is no hepatosplenomegaly. There is no CVA tenderness.   Musculoskeletal: Normal range of motion. He exhibits no edema or deformity.   +1 EDEMA R> L WITH MILD ERYTHEMA   Lymphadenopathy:     He has no cervical adenopathy.   Neurological: He is alert and oriented to person, place, and time. He has normal strength. He displays no tremor. No cranial nerve deficit.   Skin: Skin is warm and dry. No cyanosis or erythema. No pallor.   Psychiatric: He has a normal mood and affect. His speech is normal and behavior is normal.               ..    Chemistry        Component Value Date/Time     10/01/2019 0618    K 4.2 10/01/2019 0618     10/01/2019 0618    CO2 33 (H) 10/01/2019 0618    BUN 22 (H) 10/01/2019 0618    CREATININE 0.87 10/01/2019 0618    GLU 98 10/01/2019 0618        Component Value Date/Time    CALCIUM 9.0 10/01/2019 0618    ESTGFRAFRICA >60 10/01/2019 0618    EGFRNONAA >60 10/01/2019 0618            ..No results found for: CHOL  No results found for: HDL  No results found for: LDLCALC  No results found for: TRIG  No results found for: CHOLHDL  ..  Lab Results   Component Value Date    WBC 6.97 10/01/2019    HGB 15.9 10/01/2019    HCT 48.3 10/01/2019    MCV 90 10/01/2019     10/01/2019       Test(s) Reviewed  I have reviewed the following in detail:  [] Stress test   [x] Angiography   [] Echocardiogram   [x] Labs   [] Other:       Assessment:         ICD-10-CM ICD-9-CM   1. Elevated left ventricular end-diastolic pressure (LVEDP) R94.30 794.30   2. Essential hypertension I10 401.9   3. PAF (paroxysmal atrial fibrillation) I48.0 427.31   4. Long term  current use of antiarrhythmic drug Z79.899 V58.69   5. Severe obesity (BMI 35.0-39.9) with comorbidity E66.01 278.01   6. Mild CAD I25.10 414.00   7. Hypercholesteremia E78.00 272.0     Problem List Items Addressed This Visit        Cardiac/Vascular    PAF (paroxysmal atrial fibrillation)    Relevant Orders    Comprehensive metabolic panel    Essential hypertension    Relevant Orders    Comprehensive metabolic panel    Elevated left ventricular end-diastolic pressure (LVEDP) - Primary    Relevant Orders    Comprehensive metabolic panel    Mild CAD    Hypercholesteremia    Relevant Orders    Lipid panel       Endocrine    Severe obesity (BMI 35.0-39.9) with comorbidity    Relevant Orders    Comprehensive metabolic panel       Other    Long term current use of antiarrhythmic drug    Relevant Orders    Comprehensive metabolic panel           Plan:         ADD SPIRONOLACTONE, ELEVATED LVEDP INTERMITTENT EDEMA, DECREASE KCL TO QOD,ADD PRAVACHOL IN VIEW OF CAD,  CHANGE PRADAXA TO XARELTO, 15 MG,ALL CV CLINICALLY STABLE, NO ANGINA, NO OVER HF, PROBABLE DIASTOLIC COMPONENT, NO TIA, NO CLINICAL ARRHYTHMIA,CONTINUE CURRENT MEDS, EDUCATION, DIET, EXERCISE WEIGHT LOSS, RETURN TO CLINIC IN 3 MO WITH LABS  Elevated left ventricular end-diastolic pressure (LVEDP)  -     Comprehensive metabolic panel; Future; Expected date: 10/28/2019    Essential hypertension  -     Comprehensive metabolic panel; Future; Expected date: 10/28/2019    PAF (paroxysmal atrial fibrillation)  -     Comprehensive metabolic panel; Future; Expected date: 10/28/2019    Long term current use of antiarrhythmic drug  -     Comprehensive metabolic panel; Future; Expected date: 10/28/2019    Severe obesity (BMI 35.0-39.9) with comorbidity  -     Comprehensive metabolic panel; Future; Expected date: 10/28/2019    Mild CAD    Hypercholesteremia  -     Lipid panel; Future; Expected date: 01/28/2020    Other orders  -     spironolactone (ALDACTONE) 25 MG tablet;  Take 0.5 tablets (12.5 mg total) by mouth once daily.  Dispense: 45 tablet; Refill: 0  -     potassium chloride SA (K-DUR,KLOR-CON) 10 MEQ tablet; Take 1 tablet (10 mEq total) by mouth every other day.  Dispense: 90 tablet; Refill: 0  -     pravastatin (PRAVACHOL) 10 MG tablet; Take 1 tablet (10 mg total) by mouth once daily.  Dispense: 90 tablet; Refill: 0  -     rivaroxaban (XARELTO) 20 mg Tab; Take 1 tablet (20 mg total) by mouth daily with dinner or evening meal.  Dispense: 90 tablet; Refill: 0    RTC Low level/low impact aerobic exercise 5x's/wk. Heart healthy diet and risk factor modification.    See labs and med orders.    Aerobic exercise 5x's/wk. Heart healthy diet and risk factor modification.    See labs and med orders.

## 2020-02-19 RX ORDER — RIVAROXABAN 20 MG/1
TABLET, FILM COATED ORAL
Qty: 90 TABLET | Refills: 0 | Status: SHIPPED | OUTPATIENT
Start: 2020-02-19 | End: 2020-06-08 | Stop reason: SDUPTHER

## 2020-03-02 ENCOUNTER — LAB VISIT (OUTPATIENT)
Dept: LAB | Facility: HOSPITAL | Age: 70
End: 2020-03-02
Attending: INTERNAL MEDICINE
Payer: MEDICARE

## 2020-03-02 ENCOUNTER — OFFICE VISIT (OUTPATIENT)
Dept: CARDIOLOGY | Facility: CLINIC | Age: 70
End: 2020-03-02
Payer: MEDICARE

## 2020-03-02 VITALS
SYSTOLIC BLOOD PRESSURE: 126 MMHG | BODY MASS INDEX: 38.92 KG/M2 | WEIGHT: 278 LBS | DIASTOLIC BLOOD PRESSURE: 67 MMHG | HEART RATE: 71 BPM | HEIGHT: 71 IN

## 2020-03-02 DIAGNOSIS — I48.0 PAF (PAROXYSMAL ATRIAL FIBRILLATION): ICD-10-CM

## 2020-03-02 DIAGNOSIS — I10 ESSENTIAL HYPERTENSION: ICD-10-CM

## 2020-03-02 DIAGNOSIS — R94.30 ELEVATED LEFT VENTRICULAR END-DIASTOLIC PRESSURE (LVEDP): ICD-10-CM

## 2020-03-02 DIAGNOSIS — R06.02 SOB (SHORTNESS OF BREATH): ICD-10-CM

## 2020-03-02 DIAGNOSIS — Z79.01 LONG TERM (CURRENT) USE OF ANTICOAGULANTS: ICD-10-CM

## 2020-03-02 DIAGNOSIS — Z79.899 LONG TERM CURRENT USE OF ANTIARRHYTHMIC DRUG: ICD-10-CM

## 2020-03-02 DIAGNOSIS — I34.0 NON-RHEUMATIC MITRAL REGURGITATION: ICD-10-CM

## 2020-03-02 DIAGNOSIS — E66.01 SEVERE OBESITY (BMI 35.0-39.9) WITH COMORBIDITY: ICD-10-CM

## 2020-03-02 DIAGNOSIS — E78.00 HYPERCHOLESTEREMIA: ICD-10-CM

## 2020-03-02 DIAGNOSIS — I48.0 PAF (PAROXYSMAL ATRIAL FIBRILLATION): Primary | ICD-10-CM

## 2020-03-02 LAB
ALBUMIN SERPL BCP-MCNC: 3.6 G/DL (ref 3.5–5.2)
ALP SERPL-CCNC: 49 U/L (ref 55–135)
ALT SERPL W/O P-5'-P-CCNC: 26 U/L (ref 10–44)
ANION GAP SERPL CALC-SCNC: 8 MMOL/L (ref 8–16)
AST SERPL-CCNC: 28 U/L (ref 10–40)
BILIRUB SERPL-MCNC: 0.8 MG/DL (ref 0.1–1)
BNP SERPL-MCNC: 69 PG/ML (ref 0–99)
BUN SERPL-MCNC: 18 MG/DL (ref 8–23)
CALCIUM SERPL-MCNC: 9.2 MG/DL (ref 8.7–10.5)
CHLORIDE SERPL-SCNC: 102 MMOL/L (ref 95–110)
CO2 SERPL-SCNC: 30 MMOL/L (ref 23–29)
CREAT SERPL-MCNC: 0.9 MG/DL (ref 0.5–1.4)
EST. GFR  (AFRICAN AMERICAN): >60 ML/MIN/1.73 M^2
EST. GFR  (NON AFRICAN AMERICAN): >60 ML/MIN/1.73 M^2
GLUCOSE SERPL-MCNC: 73 MG/DL (ref 70–110)
POTASSIUM SERPL-SCNC: 4.8 MMOL/L (ref 3.5–5.1)
PROT SERPL-MCNC: 7 G/DL (ref 6–8.4)
SODIUM SERPL-SCNC: 140 MMOL/L (ref 136–145)

## 2020-03-02 PROCEDURE — 1101F PR PT FALLS ASSESS DOC 0-1 FALLS W/OUT INJ PAST YR: ICD-10-PCS | Mod: CPTII,S$GLB,, | Performed by: INTERNAL MEDICINE

## 2020-03-02 PROCEDURE — 1125F AMNT PAIN NOTED PAIN PRSNT: CPT | Mod: S$GLB,,, | Performed by: INTERNAL MEDICINE

## 2020-03-02 PROCEDURE — 3074F SYST BP LT 130 MM HG: CPT | Mod: CPTII,S$GLB,, | Performed by: INTERNAL MEDICINE

## 2020-03-02 PROCEDURE — 3078F DIAST BP <80 MM HG: CPT | Mod: CPTII,S$GLB,, | Performed by: INTERNAL MEDICINE

## 2020-03-02 PROCEDURE — 80053 COMPREHEN METABOLIC PANEL: CPT

## 2020-03-02 PROCEDURE — 99999 PR PBB SHADOW E&M-EST. PATIENT-LVL III: CPT | Mod: PBBFAC,,, | Performed by: INTERNAL MEDICINE

## 2020-03-02 PROCEDURE — 1101F PT FALLS ASSESS-DOCD LE1/YR: CPT | Mod: CPTII,S$GLB,, | Performed by: INTERNAL MEDICINE

## 2020-03-02 PROCEDURE — 1125F PR PAIN SEVERITY QUANTIFIED, PAIN PRESENT: ICD-10-PCS | Mod: S$GLB,,, | Performed by: INTERNAL MEDICINE

## 2020-03-02 PROCEDURE — 36415 COLL VENOUS BLD VENIPUNCTURE: CPT | Mod: PO

## 2020-03-02 PROCEDURE — 1159F MED LIST DOCD IN RCRD: CPT | Mod: S$GLB,,, | Performed by: INTERNAL MEDICINE

## 2020-03-02 PROCEDURE — 83880 ASSAY OF NATRIURETIC PEPTIDE: CPT

## 2020-03-02 PROCEDURE — 99214 PR OFFICE/OUTPT VISIT, EST, LEVL IV, 30-39 MIN: ICD-10-PCS | Mod: S$GLB,,, | Performed by: INTERNAL MEDICINE

## 2020-03-02 PROCEDURE — 3074F PR MOST RECENT SYSTOLIC BLOOD PRESSURE < 130 MM HG: ICD-10-PCS | Mod: CPTII,S$GLB,, | Performed by: INTERNAL MEDICINE

## 2020-03-02 PROCEDURE — 1159F PR MEDICATION LIST DOCUMENTED IN MEDICAL RECORD: ICD-10-PCS | Mod: S$GLB,,, | Performed by: INTERNAL MEDICINE

## 2020-03-02 PROCEDURE — 3078F PR MOST RECENT DIASTOLIC BLOOD PRESSURE < 80 MM HG: ICD-10-PCS | Mod: CPTII,S$GLB,, | Performed by: INTERNAL MEDICINE

## 2020-03-02 PROCEDURE — 99214 OFFICE O/P EST MOD 30 MIN: CPT | Mod: S$GLB,,, | Performed by: INTERNAL MEDICINE

## 2020-03-02 PROCEDURE — 99999 PR PBB SHADOW E&M-EST. PATIENT-LVL III: ICD-10-PCS | Mod: PBBFAC,,, | Performed by: INTERNAL MEDICINE

## 2020-03-02 RX ORDER — SOTALOL HYDROCHLORIDE 120 MG/1
TABLET ORAL
Qty: 180 TABLET | Refills: 1 | Status: SHIPPED | OUTPATIENT
Start: 2020-03-02 | End: 2020-07-16

## 2020-03-02 RX ORDER — FUROSEMIDE 40 MG/1
TABLET ORAL
Qty: 90 TABLET | Refills: 0 | Status: SHIPPED | OUTPATIENT
Start: 2020-03-02 | End: 2023-08-24

## 2020-03-02 NOTE — PROGRESS NOTES
Subjective:    Patient ID:  Yonathan Hernandez Sr. is a 69 y.o. male who presents for Valvular Heart Disease; Hypertension; and Atrial Fibrillation        HPI  NO LABS, HAD SP CP STILL WITH SOB, OFF PRAVACHOL FOR CRAMPS,.  NO CHEST PAIN PER SE, STILL WITH SOME LEG EDEMA IF NOT WEARING STOCKINGS, HAD MULTIPLE TEST AT PCP WHICH WERE OKAY, NO TIA TYPE SYMPTOMS, NO NEAR-SYNCOPE, NO PALPITATIONS, SEE ROS    Past Medical History:   Diagnosis Date    Atrial fibrillation     Edema     Enlarged prostate      Past Surgical History:   Procedure Laterality Date    ANGIOGRAM, CORONARY, WITH LEFT HEART CATHETERIZATION  10/1/2019    Procedure: Angiogram, Coronary, with Left Heart Cath;  Surgeon: Mica Tafoya MD;  Location: [facility] CATH;  Service: Cardiology;;    CARDIAC CATHETERIZATION       Family History   Problem Relation Age of Onset    Lung cancer Father      Social History     Socioeconomic History    Marital status:      Spouse name: Not on file    Number of children: Not on file    Years of education: Not on file    Highest education level: Not on file   Occupational History    Not on file   Social Needs    Financial resource strain: Not on file    Food insecurity:     Worry: Not on file     Inability: Not on file    Transportation needs:     Medical: Not on file     Non-medical: Not on file   Tobacco Use    Smoking status: Never Smoker    Smokeless tobacco: Never Used   Substance and Sexual Activity    Alcohol use: Not Currently    Drug use: Not Currently    Sexual activity: Not on file   Lifestyle    Physical activity:     Days per week: Not on file     Minutes per session: Not on file    Stress: Not on file   Relationships    Social connections:     Talks on phone: Not on file     Gets together: Not on file     Attends Sabianist service: Not on file     Active member of club or organization: Not on file     Attends meetings of clubs or organizations: Not on file     Relationship status: Not  on file   Other Topics Concern    Not on file   Social History Narrative    Not on file       Review of patient's allergies indicates:   Allergen Reactions    Lidocaine hcl        Current Outpatient Medications:     furosemide (LASIX) 40 MG tablet, TAKE 1 TABLET(40 MG) BY MOUTH EVERY DAY, Disp: 90 tablet, Rfl: 0    potassium chloride SA (K-DUR,KLOR-CON) 10 MEQ tablet, Take 1 tablet (10 mEq total) by mouth every other day., Disp: 90 tablet, Rfl: 0    sotalol (BETAPACE) 120 MG Tab, TK 1 T PO Q 12 H, Disp: 180 tablet, Rfl: 1    spironolactone (ALDACTONE) 25 MG tablet, Take 0.5 tablets (12.5 mg total) by mouth once daily., Disp: 45 tablet, Rfl: 0    tamsulosin (FLOMAX) 0.4 mg Cp24, TK 1 C PO QD, Disp: , Rfl: 1    XARELTO 20 mg Tab, TAKE 1 TABLET BY MOUTH ONCE DAILY WITH DINNER OR EVENING MEAL, Disp: 90 tablet, Rfl: 0    clindamycin (CLEOCIN) 300 MG capsule, TK ONE C PO BID FOR 14 DAYS, Disp: , Rfl: 0    clotrimazole (LOTRIMIN) 1 % cream, , Disp: , Rfl: 2    pravastatin (PRAVACHOL) 10 MG tablet, Take 1 tablet (10 mg total) by mouth once daily. (Patient not taking: Reported on 3/2/2020), Disp: 90 tablet, Rfl: 0    Review of Systems   Constitution: Positive for weight gain. Negative for chills, diaphoresis, fever, malaise/fatigue and night sweats.   HENT: Negative for congestion (ALLERGY), hearing loss and nosebleeds.    Eyes: Negative for blurred vision and visual disturbance.   Cardiovascular: Negative for chest pain, claudication, cyanosis, irregular heartbeat, leg swelling (MILD/L), near-syncope, orthopnea, palpitations, paroxysmal nocturnal dyspnea and syncope. Dyspnea on exertion: MILD.   Respiratory: Negative for cough, hemoptysis, shortness of breath (SOME) and wheezing.    Endocrine: Negative for cold intolerance, heat intolerance and polyuria.   Hematologic/Lymphatic: Negative for adenopathy. Does not bruise/bleed easily.   Skin: Negative for color change, itching and nail changes.   Musculoskeletal:  "Negative for back pain (CHRONIC, HAD INJECTION, DR VALERIO), falls, joint pain and stiffness.   Gastrointestinal: Negative for abdominal pain, change in bowel habit, dysphagia, flatus, heartburn, hematemesis, jaundice, melena and vomiting.   Genitourinary: Negative for dysuria, flank pain and frequency.   Neurological: Negative for brief paralysis, dizziness, focal weakness, light-headedness, loss of balance, numbness, paresthesias, seizures, sensory change, tremors and weakness.   Psychiatric/Behavioral: Negative for altered mental status, depression, memory loss and substance abuse. The patient is not nervous/anxious.    Allergic/Immunologic: Negative for hives and persistent infections.        Objective:      Vitals:    03/02/20 1143   BP: 126/67   Pulse: 71   Weight: 126.1 kg (278 lb)   Height: 5' 11" (1.803 m)   PainSc:   4     Body mass index is 38.77 kg/m².    Physical Exam   Constitutional: He is oriented to person, place, and time. He appears well-developed and well-nourished. He is active.   HENT:   Head: Normocephalic and atraumatic.   Mouth/Throat: Oropharynx is clear and moist and mucous membranes are normal.   Eyes: Pupils are equal, round, and reactive to light. Conjunctivae and EOM are normal.   Neck: Normal range of motion. Neck supple. Normal carotid pulses, no hepatojugular reflux and no JVD present. Carotid bruit is not present. No tracheal deviation, no edema and no erythema present. No thyromegaly present.   Cardiovascular: Normal rate, regular rhythm and normal heart sounds.  No extrasystoles are present. PMI is not displaced. Exam reveals no gallop, no distant heart sounds, no friction rub and no midsystolic click.   No murmur heard.  Pulses:       Carotid pulses are 2+ on the right side, and 2+ on the left side.       Radial pulses are 2+ on the right side, and 2+ on the left side.        Femoral pulses are 2+ on the right side, and 2+ on the left side.       Popliteal pulses are 2+ on the " right side, and 2+ on the left side.        Dorsalis pedis pulses are 2+ on the right side, and 2+ on the left side.        Posterior tibial pulses are 1+ on the right side, and 1+ on the left side.   Pulmonary/Chest: Effort normal and breath sounds normal. No accessory muscle usage. No tachypnea and no bradypnea. No respiratory distress.   Abdominal: Soft. Bowel sounds are normal. He exhibits no distension and no mass. There is no hepatosplenomegaly. There is no CVA tenderness.   Musculoskeletal: Normal range of motion. He exhibits no edema or deformity.   +1 EDEMA R> L WITH MILD ERYTHEMA   Lymphadenopathy:     He has no cervical adenopathy.   Neurological: He is alert and oriented to person, place, and time. He has normal strength. He displays no tremor. No cranial nerve deficit.   Skin: Skin is warm and dry. No cyanosis or erythema. No pallor.   Psychiatric: He has a normal mood and affect. His speech is normal and behavior is normal.               ..    Chemistry        Component Value Date/Time     10/01/2019 0618    K 4.2 10/01/2019 0618     10/01/2019 0618    CO2 33 (H) 10/01/2019 0618    BUN 22 (H) 10/01/2019 0618    CREATININE 0.87 10/01/2019 0618    GLU 98 10/01/2019 0618        Component Value Date/Time    CALCIUM 9.0 10/01/2019 0618    ESTGFRAFRICA >60 10/01/2019 0618    EGFRNONAA >60 10/01/2019 0618            ..No results found for: CHOL  No results found for: HDL  No results found for: LDLCALC  No results found for: TRIG  No results found for: CHOLHDL  ..  Lab Results   Component Value Date    WBC 6.97 10/01/2019    HGB 15.9 10/01/2019    HCT 48.3 10/01/2019    MCV 90 10/01/2019     10/01/2019       Test(s) Reviewed  I have reviewed the following in detail:  [] Stress test   [] Angiography   [] Echocardiogram   [x] Labs   [] Other:       Assessment:         ICD-10-CM ICD-9-CM   1. PAF (paroxysmal atrial fibrillation) I48.0 427.31   2. Essential hypertension I10 401.9   3.  Non-rheumatic mitral regurgitation I34.0 424.0   4. Long term (current) use of anticoagulants Z79.01 V58.61   5. Hypercholesteremia E78.00 272.0   6. Long term current use of antiarrhythmic drug Z79.899 V58.69   7. Severe obesity (BMI 35.0-39.9) with comorbidity E66.01 278.01   8. SOB (shortness of breath) R06.02 786.05   9. Elevated left ventricular end-diastolic pressure (LVEDP) R94.30 794.30     Problem List Items Addressed This Visit        Cardiac/Vascular    PAF (paroxysmal atrial fibrillation) - Primary    Relevant Orders    Comprehensive metabolic panel    Essential hypertension    Relevant Orders    Comprehensive metabolic panel    Non-rheumatic mitral regurgitation    Relevant Orders    Comprehensive metabolic panel    Elevated left ventricular end-diastolic pressure (LVEDP)    Hypercholesteremia    Relevant Orders    Comprehensive metabolic panel       Hematology    Long term (current) use of anticoagulants    Relevant Orders    Comprehensive metabolic panel       Endocrine    Severe obesity (BMI 35.0-39.9) with comorbidity    Relevant Orders    Comprehensive metabolic panel       Other    Long term current use of antiarrhythmic drug    Relevant Orders    Comprehensive metabolic panel    SOB (shortness of breath)    Relevant Orders    Comprehensive metabolic panel    Complete PFT w/ bronchodilator    Brain natriuretic peptide           Plan:     C HECK LABS, INCLUDING CMP AND BNP, TO ASSESS HEART FAILURE, SYMPTOMS MOSTLY PROBABLY PULMONARY, IN ADDITION TO WEIGHT, NO OVERT HEART FAILURE, SYMPTOMS OUT OF PROPORTION TO FINDINGS ON CARDIAC TESTING, WILL CHECK PFT'S, BNP, ALL OTHER CV CLINICALLY STABLE, NO ANGINA, CLASS 1- HF, NO TIA, NO CLINICAL ARRHYTHMIA,CONTINUE CURRENT MEDS, EDUCATION, DIET, EXERCISE, WEIGHT LOSS, RTC IN 4 MO      PAF (paroxysmal atrial fibrillation)  -     Comprehensive metabolic panel; Future; Expected date: 03/02/2020    Essential hypertension  -     Comprehensive metabolic panel;  Future; Expected date: 03/02/2020    Non-rheumatic mitral regurgitation  -     Comprehensive metabolic panel; Future; Expected date: 03/02/2020    Long term (current) use of anticoagulants  -     Comprehensive metabolic panel; Future; Expected date: 03/02/2020    Hypercholesteremia  -     Comprehensive metabolic panel; Future; Expected date: 03/02/2020    Long term current use of antiarrhythmic drug  -     Comprehensive metabolic panel; Future; Expected date: 03/02/2020    Severe obesity (BMI 35.0-39.9) with comorbidity  -     Comprehensive metabolic panel; Future; Expected date: 03/02/2020    SOB (shortness of breath)  -     Comprehensive metabolic panel; Future; Expected date: 03/02/2020  -     Complete PFT w/ bronchodilator; Future  -     Brain natriuretic peptide; Future; Expected date: 03/02/2020    Elevated left ventricular end-diastolic pressure (LVEDP)    Other orders  -     sotalol (BETAPACE) 120 MG Tab; TK 1 T PO Q 12 H  Dispense: 180 tablet; Refill: 1  -     furosemide (LASIX) 40 MG tablet; TAKE 1 TABLET(40 MG) BY MOUTH EVERY DAY  Dispense: 90 tablet; Refill: 0    RTC Low level/low impact aerobic exercise 5x's/wk. Heart healthy diet and risk factor modification.    See labs and med orders.    Aerobic exercise 5x's/wk. Heart healthy diet and risk factor modification.    See labs and med orders.

## 2020-06-08 ENCOUNTER — OFFICE VISIT (OUTPATIENT)
Dept: CARDIOLOGY | Facility: CLINIC | Age: 70
End: 2020-06-08
Payer: MEDICARE

## 2020-06-08 VITALS
DIASTOLIC BLOOD PRESSURE: 82 MMHG | BODY MASS INDEX: 37.97 KG/M2 | WEIGHT: 271.19 LBS | HEART RATE: 68 BPM | HEIGHT: 71 IN | SYSTOLIC BLOOD PRESSURE: 136 MMHG

## 2020-06-08 DIAGNOSIS — Z79.01 LONG TERM (CURRENT) USE OF ANTICOAGULANTS: ICD-10-CM

## 2020-06-08 DIAGNOSIS — E66.01 SEVERE OBESITY (BMI 35.0-39.9) WITH COMORBIDITY: ICD-10-CM

## 2020-06-08 DIAGNOSIS — I25.10 MILD CAD: ICD-10-CM

## 2020-06-08 DIAGNOSIS — I34.0 NON-RHEUMATIC MITRAL REGURGITATION: ICD-10-CM

## 2020-06-08 DIAGNOSIS — E78.00 HYPERCHOLESTEREMIA: ICD-10-CM

## 2020-06-08 DIAGNOSIS — I48.0 PAF (PAROXYSMAL ATRIAL FIBRILLATION): ICD-10-CM

## 2020-06-08 DIAGNOSIS — Z79.899 LONG TERM CURRENT USE OF ANTIARRHYTHMIC DRUG: ICD-10-CM

## 2020-06-08 DIAGNOSIS — I10 ESSENTIAL HYPERTENSION: Primary | ICD-10-CM

## 2020-06-08 PROCEDURE — 99999 PR PBB SHADOW E&M-EST. PATIENT-LVL III: CPT | Mod: PBBFAC,,, | Performed by: INTERNAL MEDICINE

## 2020-06-08 PROCEDURE — 3079F PR MOST RECENT DIASTOLIC BLOOD PRESSURE 80-89 MM HG: ICD-10-PCS | Mod: CPTII,S$GLB,, | Performed by: INTERNAL MEDICINE

## 2020-06-08 PROCEDURE — 99214 OFFICE O/P EST MOD 30 MIN: CPT | Mod: S$GLB,,, | Performed by: INTERNAL MEDICINE

## 2020-06-08 PROCEDURE — 3075F SYST BP GE 130 - 139MM HG: CPT | Mod: CPTII,S$GLB,, | Performed by: INTERNAL MEDICINE

## 2020-06-08 PROCEDURE — 3075F PR MOST RECENT SYSTOLIC BLOOD PRESS GE 130-139MM HG: ICD-10-PCS | Mod: CPTII,S$GLB,, | Performed by: INTERNAL MEDICINE

## 2020-06-08 PROCEDURE — 99214 PR OFFICE/OUTPT VISIT, EST, LEVL IV, 30-39 MIN: ICD-10-PCS | Mod: S$GLB,,, | Performed by: INTERNAL MEDICINE

## 2020-06-08 PROCEDURE — 1126F AMNT PAIN NOTED NONE PRSNT: CPT | Mod: S$GLB,,, | Performed by: INTERNAL MEDICINE

## 2020-06-08 PROCEDURE — 1159F MED LIST DOCD IN RCRD: CPT | Mod: S$GLB,,, | Performed by: INTERNAL MEDICINE

## 2020-06-08 PROCEDURE — 1126F PR PAIN SEVERITY QUANTIFIED, NO PAIN PRESENT: ICD-10-PCS | Mod: S$GLB,,, | Performed by: INTERNAL MEDICINE

## 2020-06-08 PROCEDURE — 1101F PT FALLS ASSESS-DOCD LE1/YR: CPT | Mod: CPTII,S$GLB,, | Performed by: INTERNAL MEDICINE

## 2020-06-08 PROCEDURE — 1101F PR PT FALLS ASSESS DOC 0-1 FALLS W/OUT INJ PAST YR: ICD-10-PCS | Mod: CPTII,S$GLB,, | Performed by: INTERNAL MEDICINE

## 2020-06-08 PROCEDURE — 1159F PR MEDICATION LIST DOCUMENTED IN MEDICAL RECORD: ICD-10-PCS | Mod: S$GLB,,, | Performed by: INTERNAL MEDICINE

## 2020-06-08 PROCEDURE — 99999 PR PBB SHADOW E&M-EST. PATIENT-LVL III: ICD-10-PCS | Mod: PBBFAC,,, | Performed by: INTERNAL MEDICINE

## 2020-06-08 PROCEDURE — 3079F DIAST BP 80-89 MM HG: CPT | Mod: CPTII,S$GLB,, | Performed by: INTERNAL MEDICINE

## 2020-06-08 RX ORDER — PRAVASTATIN SODIUM 10 MG/1
10 TABLET ORAL DAILY
Qty: 90 TABLET | Refills: 1 | Status: SHIPPED | OUTPATIENT
Start: 2020-06-08 | End: 2022-06-02 | Stop reason: SDUPTHER

## 2020-06-08 NOTE — PROGRESS NOTES
Subjective:    Patient ID:  Yonathan Hernandez Sr. is a 69 y.o. male who presents for Shortness of Breath; Hypertension; Hyperlipidemia; and Atrial Fibrillation        HPI  DISCUSSED LABS AND GOALS CMP OK, BNP OK, DOING WELL,MILDLY ABN PFT'S  , NO CHEST PAIN NO SIGNIFICANT SHORTNESS OF BREATH NO TIA TYPE SYMPTOMS NO NEAR-SYNCOPE, SEE ROS    Past Medical History:   Diagnosis Date    Atrial fibrillation     Edema     Enlarged prostate      Past Surgical History:   Procedure Laterality Date    ANGIOGRAM, CORONARY, WITH LEFT HEART CATHETERIZATION  10/1/2019    Procedure: Angiogram, Coronary, with Left Heart Cath;  Surgeon: Mica Tafoya MD;  Location: [facility] CATH;  Service: Cardiology;;    CARDIAC CATHETERIZATION       Family History   Problem Relation Age of Onset    Lung cancer Father      Social History     Socioeconomic History    Marital status:      Spouse name: Not on file    Number of children: Not on file    Years of education: Not on file    Highest education level: Not on file   Occupational History    Not on file   Social Needs    Financial resource strain: Not on file    Food insecurity:     Worry: Not on file     Inability: Not on file    Transportation needs:     Medical: Not on file     Non-medical: Not on file   Tobacco Use    Smoking status: Never Smoker    Smokeless tobacco: Never Used   Substance and Sexual Activity    Alcohol use: Not Currently    Drug use: Not Currently    Sexual activity: Not on file   Lifestyle    Physical activity:     Days per week: Not on file     Minutes per session: Not on file    Stress: Not on file   Relationships    Social connections:     Talks on phone: Not on file     Gets together: Not on file     Attends Muslim service: Not on file     Active member of club or organization: Not on file     Attends meetings of clubs or organizations: Not on file     Relationship status: Not on file   Other Topics Concern    Not on file   Social  History Narrative    Not on file       Review of patient's allergies indicates:   Allergen Reactions    Lidocaine hcl        Current Outpatient Medications:     clotrimazole (LOTRIMIN) 1 % cream, , Disp: , Rfl: 2    furosemide (LASIX) 40 MG tablet, TAKE 1 TABLET(40 MG) BY MOUTH EVERY DAY, Disp: 90 tablet, Rfl: 0    potassium chloride SA (K-DUR,KLOR-CON) 10 MEQ tablet, Take 1 tablet (10 mEq total) by mouth every other day., Disp: 90 tablet, Rfl: 0    pravastatin (PRAVACHOL) 10 MG tablet, Take 1 tablet (10 mg total) by mouth once daily., Disp: 90 tablet, Rfl: 1    rivaroxaban (XARELTO) 20 mg Tab, TAKE 1 TABLET BY MOUTH ONCE DAILY WITH DINNER OR EVENING MEAL, Disp: 90 tablet, Rfl: 1    sotalol (BETAPACE) 120 MG Tab, TK 1 T PO Q 12 H, Disp: 180 tablet, Rfl: 1    spironolactone (ALDACTONE) 25 MG tablet, Take 0.5 tablets (12.5 mg total) by mouth once daily., Disp: 45 tablet, Rfl: 0    tamsulosin (FLOMAX) 0.4 mg Cp24, TK 1 C PO QD, Disp: , Rfl: 1    clindamycin (CLEOCIN) 300 MG capsule, TK ONE C PO BID FOR 14 DAYS, Disp: , Rfl: 0    Review of Systems   Constitution: Negative for chills, diaphoresis, fever, malaise/fatigue and night sweats. Weight loss: DIET.   HENT: Negative for congestion (ALLERGY), hearing loss and nosebleeds.    Eyes: Negative for blurred vision and visual disturbance.   Cardiovascular: Negative for chest pain, claudication, cyanosis, dyspnea on exertion (MILD,), irregular heartbeat, leg swelling (MILD/L), near-syncope, orthopnea, palpitations, paroxysmal nocturnal dyspnea and syncope.   Respiratory: Negative for cough, hemoptysis, shortness of breath (BETTER) and wheezing.    Endocrine: Negative for cold intolerance, heat intolerance and polyuria.   Hematologic/Lymphatic: Negative for adenopathy. Does not bruise/bleed easily.   Skin: Negative for color change, itching and nail changes.   Musculoskeletal: Negative for back pain (CHRONIC, HAD INJECTION, DR VALERIO), falls, joint pain and  "stiffness.   Gastrointestinal: Negative for abdominal pain, change in bowel habit, dysphagia, heartburn, hematemesis, jaundice, melena and vomiting.   Genitourinary: Negative for dysuria, flank pain and frequency.   Neurological: Negative for brief paralysis, dizziness, focal weakness, light-headedness, loss of balance, numbness, paresthesias, seizures, sensory change, tremors and weakness.   Psychiatric/Behavioral: Negative for altered mental status, depression and memory loss. The patient is not nervous/anxious.    Allergic/Immunologic: Negative for hives and persistent infections.        Objective:      Vitals:    06/08/20 1133   BP: 136/82   Pulse: 68   Weight: 123 kg (271 lb 2.7 oz)   Height: 5' 11" (1.803 m)   PainSc: 0-No pain     Body mass index is 37.82 kg/m².    Physical Exam   Constitutional: He is oriented to person, place, and time. He appears well-developed and well-nourished. He is active.   HENT:   Head: Normocephalic and atraumatic.   Mouth/Throat: Oropharynx is clear and moist and mucous membranes are normal.   Eyes: Pupils are equal, round, and reactive to light. Conjunctivae and EOM are normal.   Neck: Normal range of motion. Neck supple. Normal carotid pulses, no hepatojugular reflux and no JVD present. Carotid bruit is not present. No tracheal deviation, no edema and no erythema present. No thyromegaly present.   Cardiovascular: Normal rate, regular rhythm and normal heart sounds.  No extrasystoles are present. PMI is not displaced. Exam reveals no gallop, no distant heart sounds, no friction rub and no midsystolic click.   No murmur heard.  Pulses:       Carotid pulses are 2+ on the right side, and 2+ on the left side.       Radial pulses are 2+ on the right side, and 2+ on the left side.        Femoral pulses are 2+ on the right side, and 2+ on the left side.       Popliteal pulses are 2+ on the right side, and 2+ on the left side.        Dorsalis pedis pulses are 2+ on the right side, and 2+ " on the left side.        Posterior tibial pulses are 1+ on the right side, and 1+ on the left side.   Pulmonary/Chest: Effort normal and breath sounds normal. No accessory muscle usage. No tachypnea and no bradypnea. No respiratory distress.   Abdominal: Soft. Bowel sounds are normal. He exhibits no distension and no mass. There is no hepatosplenomegaly. There is no CVA tenderness.   Musculoskeletal: Normal range of motion. He exhibits no edema or deformity.   +1 EDEMA R> L WITH MILD ERYTHEMA   Lymphadenopathy:     He has no cervical adenopathy.   Neurological: He is alert and oriented to person, place, and time. He has normal strength. He displays no tremor. No cranial nerve deficit.   Skin: Skin is warm and dry. No cyanosis or erythema. No pallor.   Psychiatric: He has a normal mood and affect. His speech is normal and behavior is normal.               ..    Chemistry        Component Value Date/Time     03/02/2020 1237    K 4.8 03/02/2020 1237     03/02/2020 1237    CO2 30 (H) 03/02/2020 1237    BUN 18 03/02/2020 1237    CREATININE 0.9 03/02/2020 1237    GLU 73 03/02/2020 1237        Component Value Date/Time    CALCIUM 9.2 03/02/2020 1237    ALKPHOS 49 (L) 03/02/2020 1237    AST 28 03/02/2020 1237    ALT 26 03/02/2020 1237    BILITOT 0.8 03/02/2020 1237    ESTGFRAFRICA >60.0 03/02/2020 1237    EGFRNONAA >60.0 03/02/2020 1237            ..No results found for: CHOL  No results found for: HDL  No results found for: LDLCALC  No results found for: TRIG  No results found for: CHOLHDL  ..  Lab Results   Component Value Date    WBC 6.97 10/01/2019    HGB 15.9 10/01/2019    HCT 48.3 10/01/2019    MCV 90 10/01/2019     10/01/2019       Test(s) Reviewed  I have reviewed the following in detail:  [] Stress test   [] Angiography   [] Echocardiogram   [x] Labs   [x] Other:       Assessment:         ICD-10-CM ICD-9-CM   1. Essential hypertension I10 401.9   2. Mild CAD I25.10 414.00   3. PAF (paroxysmal  atrial fibrillation) I48.0 427.31   4. Non-rheumatic mitral regurgitation I34.0 424.0   5. Long term (current) use of anticoagulants Z79.01 V58.61   6. Severe obesity (BMI 35.0-39.9) with comorbidity E66.01 278.01   7. Long term current use of antiarrhythmic drug Z79.899 V58.69   8. Hypercholesteremia E78.00 272.0     Problem List Items Addressed This Visit        Cardiac/Vascular    PAF (paroxysmal atrial fibrillation)    Relevant Orders    Comprehensive metabolic panel    Essential hypertension - Primary    Relevant Orders    Comprehensive metabolic panel    Non-rheumatic mitral regurgitation    Relevant Orders    Comprehensive metabolic panel    Mild CAD    Relevant Orders    Comprehensive metabolic panel    Hypercholesteremia    Relevant Orders    Comprehensive metabolic panel    Lipid Panel       Hematology    Long term (current) use of anticoagulants    Relevant Orders    Comprehensive metabolic panel       Endocrine    Severe obesity (BMI 35.0-39.9) with comorbidity       Other    Long term current use of antiarrhythmic drug    Relevant Orders    Comprehensive metabolic panel           Plan:     ALL CV CLINICALLY STABLE, NO ANGINA, NO HF, NO TIA, NO CLINICAL ARRHYTHMIA,CONTINUE CURRENT MEDS, EDUCATION, DIET, EXERCISE, WEIGHT LOSS, RETURN TO CLINIC IN 6 MONTHS WITH LABS       Essential hypertension  -     Comprehensive metabolic panel; Future; Expected date: 12/08/2020    Mild CAD  -     Comprehensive metabolic panel; Future; Expected date: 12/08/2020    PAF (paroxysmal atrial fibrillation)  -     Comprehensive metabolic panel; Future; Expected date: 12/08/2020    Non-rheumatic mitral regurgitation  -     Comprehensive metabolic panel; Future; Expected date: 12/08/2020    Long term (current) use of anticoagulants  -     Comprehensive metabolic panel; Future; Expected date: 12/08/2020    Severe obesity (BMI 35.0-39.9) with comorbidity    Long term current use of antiarrhythmic drug  -     Comprehensive  metabolic panel; Future; Expected date: 12/08/2020    Hypercholesteremia  -     Comprehensive metabolic panel; Future; Expected date: 12/08/2020  -     Lipid Panel; Future; Expected date: 12/08/2020    Other orders  -     pravastatin (PRAVACHOL) 10 MG tablet; Take 1 tablet (10 mg total) by mouth once daily.  Dispense: 90 tablet; Refill: 1  -     rivaroxaban (XARELTO) 20 mg Tab; TAKE 1 TABLET BY MOUTH ONCE DAILY WITH DINNER OR EVENING MEAL  Dispense: 90 tablet; Refill: 1    RTC Low level/low impact aerobic exercise 5x's/wk. Heart healthy diet and risk factor modification.    See labs and med orders.    Aerobic exercise 5x's/wk. Heart healthy diet and risk factor modification.    See labs and med orders.

## 2020-12-02 ENCOUNTER — LAB VISIT (OUTPATIENT)
Dept: LAB | Facility: HOSPITAL | Age: 70
End: 2020-12-02
Attending: INTERNAL MEDICINE
Payer: MEDICARE

## 2020-12-02 DIAGNOSIS — I34.0 NON-RHEUMATIC MITRAL REGURGITATION: ICD-10-CM

## 2020-12-02 DIAGNOSIS — Z79.899 LONG TERM CURRENT USE OF ANTIARRHYTHMIC DRUG: ICD-10-CM

## 2020-12-02 DIAGNOSIS — I10 ESSENTIAL HYPERTENSION: ICD-10-CM

## 2020-12-02 DIAGNOSIS — E78.00 HYPERCHOLESTEREMIA: ICD-10-CM

## 2020-12-02 DIAGNOSIS — Z79.01 LONG TERM (CURRENT) USE OF ANTICOAGULANTS: ICD-10-CM

## 2020-12-02 DIAGNOSIS — I48.0 PAF (PAROXYSMAL ATRIAL FIBRILLATION): ICD-10-CM

## 2020-12-02 DIAGNOSIS — I25.10 MILD CAD: ICD-10-CM

## 2020-12-02 LAB
ALBUMIN SERPL BCP-MCNC: 3.7 G/DL (ref 3.5–5.2)
ALP SERPL-CCNC: 51 U/L (ref 55–135)
ALT SERPL W/O P-5'-P-CCNC: 25 U/L (ref 10–44)
ANION GAP SERPL CALC-SCNC: 6 MMOL/L (ref 8–16)
AST SERPL-CCNC: 23 U/L (ref 10–40)
BILIRUB SERPL-MCNC: 0.7 MG/DL (ref 0.1–1)
BUN SERPL-MCNC: 18 MG/DL (ref 8–23)
CALCIUM SERPL-MCNC: 9.6 MG/DL (ref 8.7–10.5)
CHLORIDE SERPL-SCNC: 101 MMOL/L (ref 95–110)
CHOLEST SERPL-MCNC: 137 MG/DL (ref 120–199)
CHOLEST/HDLC SERPL: 3.5 {RATIO} (ref 2–5)
CO2 SERPL-SCNC: 35 MMOL/L (ref 23–29)
CREAT SERPL-MCNC: 1.1 MG/DL (ref 0.5–1.4)
EST. GFR  (AFRICAN AMERICAN): >60 ML/MIN/1.73 M^2
EST. GFR  (NON AFRICAN AMERICAN): >60 ML/MIN/1.73 M^2
GLUCOSE SERPL-MCNC: 103 MG/DL (ref 70–110)
HDLC SERPL-MCNC: 39 MG/DL (ref 40–75)
HDLC SERPL: 28.5 % (ref 20–50)
LDLC SERPL CALC-MCNC: 64.2 MG/DL (ref 63–159)
NONHDLC SERPL-MCNC: 98 MG/DL
POTASSIUM SERPL-SCNC: 4.9 MMOL/L (ref 3.5–5.1)
PROT SERPL-MCNC: 7 G/DL (ref 6–8.4)
SODIUM SERPL-SCNC: 142 MMOL/L (ref 136–145)
TRIGL SERPL-MCNC: 169 MG/DL (ref 30–150)

## 2020-12-02 PROCEDURE — 80061 LIPID PANEL: CPT

## 2020-12-02 PROCEDURE — 36415 COLL VENOUS BLD VENIPUNCTURE: CPT | Mod: PO

## 2020-12-02 PROCEDURE — 80053 COMPREHEN METABOLIC PANEL: CPT

## 2021-03-05 ENCOUNTER — OFFICE VISIT (OUTPATIENT)
Dept: CARDIOLOGY | Facility: CLINIC | Age: 71
End: 2021-03-05
Payer: MEDICARE

## 2021-03-05 VITALS
HEIGHT: 71 IN | BODY MASS INDEX: 39.14 KG/M2 | DIASTOLIC BLOOD PRESSURE: 71 MMHG | HEART RATE: 67 BPM | WEIGHT: 279.56 LBS | SYSTOLIC BLOOD PRESSURE: 129 MMHG

## 2021-03-05 DIAGNOSIS — I25.10 MILD CAD: ICD-10-CM

## 2021-03-05 DIAGNOSIS — R94.30 ELEVATED LEFT VENTRICULAR END-DIASTOLIC PRESSURE (LVEDP): ICD-10-CM

## 2021-03-05 DIAGNOSIS — I48.0 PAF (PAROXYSMAL ATRIAL FIBRILLATION): Primary | ICD-10-CM

## 2021-03-05 DIAGNOSIS — Z79.899 LONG TERM CURRENT USE OF ANTIARRHYTHMIC DRUG: ICD-10-CM

## 2021-03-05 DIAGNOSIS — Z79.01 LONG TERM (CURRENT) USE OF ANTICOAGULANTS: ICD-10-CM

## 2021-03-05 DIAGNOSIS — I10 ESSENTIAL HYPERTENSION: ICD-10-CM

## 2021-03-05 DIAGNOSIS — E66.01 SEVERE OBESITY (BMI 35.0-39.9) WITH COMORBIDITY: ICD-10-CM

## 2021-03-05 DIAGNOSIS — I34.0 NON-RHEUMATIC MITRAL REGURGITATION: ICD-10-CM

## 2021-03-05 PROCEDURE — 1159F MED LIST DOCD IN RCRD: CPT | Mod: S$GLB,,, | Performed by: INTERNAL MEDICINE

## 2021-03-05 PROCEDURE — 3074F PR MOST RECENT SYSTOLIC BLOOD PRESSURE < 130 MM HG: ICD-10-PCS | Mod: CPTII,S$GLB,, | Performed by: INTERNAL MEDICINE

## 2021-03-05 PROCEDURE — 1159F PR MEDICATION LIST DOCUMENTED IN MEDICAL RECORD: ICD-10-PCS | Mod: S$GLB,,, | Performed by: INTERNAL MEDICINE

## 2021-03-05 PROCEDURE — 3078F PR MOST RECENT DIASTOLIC BLOOD PRESSURE < 80 MM HG: ICD-10-PCS | Mod: CPTII,S$GLB,, | Performed by: INTERNAL MEDICINE

## 2021-03-05 PROCEDURE — 3008F BODY MASS INDEX DOCD: CPT | Mod: CPTII,S$GLB,, | Performed by: INTERNAL MEDICINE

## 2021-03-05 PROCEDURE — 1125F PR PAIN SEVERITY QUANTIFIED, PAIN PRESENT: ICD-10-PCS | Mod: S$GLB,,, | Performed by: INTERNAL MEDICINE

## 2021-03-05 PROCEDURE — 3074F SYST BP LT 130 MM HG: CPT | Mod: CPTII,S$GLB,, | Performed by: INTERNAL MEDICINE

## 2021-03-05 PROCEDURE — 99999 PR PBB SHADOW E&M-EST. PATIENT-LVL III: CPT | Mod: PBBFAC,,, | Performed by: INTERNAL MEDICINE

## 2021-03-05 PROCEDURE — 1125F AMNT PAIN NOTED PAIN PRSNT: CPT | Mod: S$GLB,,, | Performed by: INTERNAL MEDICINE

## 2021-03-05 PROCEDURE — 99999 PR PBB SHADOW E&M-EST. PATIENT-LVL III: ICD-10-PCS | Mod: PBBFAC,,, | Performed by: INTERNAL MEDICINE

## 2021-03-05 PROCEDURE — 99214 PR OFFICE/OUTPT VISIT, EST, LEVL IV, 30-39 MIN: ICD-10-PCS | Mod: S$GLB,,, | Performed by: INTERNAL MEDICINE

## 2021-03-05 PROCEDURE — 99214 OFFICE O/P EST MOD 30 MIN: CPT | Mod: S$GLB,,, | Performed by: INTERNAL MEDICINE

## 2021-03-05 PROCEDURE — 3078F DIAST BP <80 MM HG: CPT | Mod: CPTII,S$GLB,, | Performed by: INTERNAL MEDICINE

## 2021-03-05 PROCEDURE — 3008F PR BODY MASS INDEX (BMI) DOCUMENTED: ICD-10-PCS | Mod: CPTII,S$GLB,, | Performed by: INTERNAL MEDICINE

## 2021-03-05 RX ORDER — SOTALOL HYDROCHLORIDE 120 MG/1
TABLET ORAL
Qty: 180 TABLET | Refills: 1 | Status: SHIPPED | OUTPATIENT
Start: 2021-03-05 | End: 2021-09-13 | Stop reason: SDUPTHER

## 2021-03-05 RX ORDER — FUROSEMIDE 20 MG/1
20 TABLET ORAL DAILY
COMMUNITY

## 2021-03-16 ENCOUNTER — CLINICAL SUPPORT (OUTPATIENT)
Dept: CARDIOLOGY | Facility: CLINIC | Age: 71
End: 2021-03-16
Attending: INTERNAL MEDICINE
Payer: MEDICARE

## 2021-03-16 DIAGNOSIS — Z79.899 LONG TERM CURRENT USE OF ANTIARRHYTHMIC DRUG: ICD-10-CM

## 2021-03-16 DIAGNOSIS — I48.0 PAF (PAROXYSMAL ATRIAL FIBRILLATION): ICD-10-CM

## 2021-03-16 PROCEDURE — 93224 HOLTER MONITOR - 24 HOUR (CUPID ONLY): ICD-10-PCS | Mod: S$GLB,,, | Performed by: INTERNAL MEDICINE

## 2021-03-16 PROCEDURE — 93224 XTRNL ECG REC UP TO 48 HRS: CPT | Mod: S$GLB,,, | Performed by: INTERNAL MEDICINE

## 2021-03-18 LAB
OHS CV EVENT MONITOR DAY: 0
OHS CV HOLTER LENGTH DECIMAL HOURS: 24
OHS CV HOLTER LENGTH HOURS: 24
OHS CV HOLTER LENGTH MINUTES: 0

## 2021-09-13 ENCOUNTER — OFFICE VISIT (OUTPATIENT)
Dept: CARDIOLOGY | Facility: CLINIC | Age: 71
End: 2021-09-13
Payer: MEDICARE

## 2021-09-13 ENCOUNTER — LAB VISIT (OUTPATIENT)
Dept: LAB | Facility: HOSPITAL | Age: 71
End: 2021-09-13
Attending: INTERNAL MEDICINE
Payer: MEDICARE

## 2021-09-13 VITALS
OXYGEN SATURATION: 98 % | SYSTOLIC BLOOD PRESSURE: 118 MMHG | HEART RATE: 70 BPM | HEIGHT: 71 IN | WEIGHT: 269.81 LBS | BODY MASS INDEX: 37.77 KG/M2 | DIASTOLIC BLOOD PRESSURE: 65 MMHG

## 2021-09-13 DIAGNOSIS — Z79.01 LONG TERM (CURRENT) USE OF ANTICOAGULANTS: Chronic | ICD-10-CM

## 2021-09-13 DIAGNOSIS — I48.0 PAF (PAROXYSMAL ATRIAL FIBRILLATION): ICD-10-CM

## 2021-09-13 DIAGNOSIS — Z79.01 LONG TERM (CURRENT) USE OF ANTICOAGULANTS: ICD-10-CM

## 2021-09-13 DIAGNOSIS — E78.00 HYPERCHOLESTEREMIA: ICD-10-CM

## 2021-09-13 DIAGNOSIS — I48.0 PAF (PAROXYSMAL ATRIAL FIBRILLATION): Primary | Chronic | ICD-10-CM

## 2021-09-13 DIAGNOSIS — E78.00 HYPERCHOLESTEREMIA: Chronic | ICD-10-CM

## 2021-09-13 DIAGNOSIS — I10 ESSENTIAL HYPERTENSION: Chronic | ICD-10-CM

## 2021-09-13 DIAGNOSIS — Z79.899 LONG TERM CURRENT USE OF ANTIARRHYTHMIC DRUG: ICD-10-CM

## 2021-09-13 DIAGNOSIS — Z79.899 LONG TERM CURRENT USE OF ANTIARRHYTHMIC DRUG: Chronic | ICD-10-CM

## 2021-09-13 DIAGNOSIS — I10 ESSENTIAL HYPERTENSION: ICD-10-CM

## 2021-09-13 DIAGNOSIS — I25.10 MILD CAD: Chronic | ICD-10-CM

## 2021-09-13 DIAGNOSIS — E66.01 SEVERE OBESITY (BMI 35.0-39.9) WITH COMORBIDITY: Chronic | ICD-10-CM

## 2021-09-13 DIAGNOSIS — I34.0 NON-RHEUMATIC MITRAL REGURGITATION: ICD-10-CM

## 2021-09-13 LAB
ALBUMIN SERPL BCP-MCNC: 3.9 G/DL (ref 3.5–5.2)
ALP SERPL-CCNC: 62 U/L (ref 55–135)
ALT SERPL W/O P-5'-P-CCNC: 30 U/L (ref 10–44)
ANION GAP SERPL CALC-SCNC: 10 MMOL/L (ref 8–16)
AST SERPL-CCNC: 29 U/L (ref 10–40)
BILIRUB SERPL-MCNC: 0.8 MG/DL (ref 0.1–1)
BUN SERPL-MCNC: 13 MG/DL (ref 8–23)
CALCIUM SERPL-MCNC: 9.8 MG/DL (ref 8.7–10.5)
CHLORIDE SERPL-SCNC: 101 MMOL/L (ref 95–110)
CO2 SERPL-SCNC: 28 MMOL/L (ref 23–29)
CREAT SERPL-MCNC: 0.9 MG/DL (ref 0.5–1.4)
EST. GFR  (AFRICAN AMERICAN): >60 ML/MIN/1.73 M^2
EST. GFR  (NON AFRICAN AMERICAN): >60 ML/MIN/1.73 M^2
GLUCOSE SERPL-MCNC: 87 MG/DL (ref 70–110)
POTASSIUM SERPL-SCNC: 5.2 MMOL/L (ref 3.5–5.1)
PROT SERPL-MCNC: 7.6 G/DL (ref 6–8.4)
SODIUM SERPL-SCNC: 139 MMOL/L (ref 136–145)

## 2021-09-13 PROCEDURE — 80053 COMPREHEN METABOLIC PANEL: CPT | Performed by: INTERNAL MEDICINE

## 2021-09-13 PROCEDURE — 99999 PR PBB SHADOW E&M-EST. PATIENT-LVL III: ICD-10-PCS | Mod: PBBFAC,,, | Performed by: INTERNAL MEDICINE

## 2021-09-13 PROCEDURE — 1126F PR PAIN SEVERITY QUANTIFIED, NO PAIN PRESENT: ICD-10-PCS | Mod: CPTII,S$GLB,, | Performed by: INTERNAL MEDICINE

## 2021-09-13 PROCEDURE — 3008F PR BODY MASS INDEX (BMI) DOCUMENTED: ICD-10-PCS | Mod: CPTII,S$GLB,, | Performed by: INTERNAL MEDICINE

## 2021-09-13 PROCEDURE — 3074F PR MOST RECENT SYSTOLIC BLOOD PRESSURE < 130 MM HG: ICD-10-PCS | Mod: CPTII,S$GLB,, | Performed by: INTERNAL MEDICINE

## 2021-09-13 PROCEDURE — 1101F PT FALLS ASSESS-DOCD LE1/YR: CPT | Mod: CPTII,S$GLB,, | Performed by: INTERNAL MEDICINE

## 2021-09-13 PROCEDURE — 1101F PR PT FALLS ASSESS DOC 0-1 FALLS W/OUT INJ PAST YR: ICD-10-PCS | Mod: CPTII,S$GLB,, | Performed by: INTERNAL MEDICINE

## 2021-09-13 PROCEDURE — 99214 PR OFFICE/OUTPT VISIT, EST, LEVL IV, 30-39 MIN: ICD-10-PCS | Mod: S$GLB,,, | Performed by: INTERNAL MEDICINE

## 2021-09-13 PROCEDURE — 1159F PR MEDICATION LIST DOCUMENTED IN MEDICAL RECORD: ICD-10-PCS | Mod: CPTII,S$GLB,, | Performed by: INTERNAL MEDICINE

## 2021-09-13 PROCEDURE — 1159F MED LIST DOCD IN RCRD: CPT | Mod: CPTII,S$GLB,, | Performed by: INTERNAL MEDICINE

## 2021-09-13 PROCEDURE — 85018 HEMOGLOBIN: CPT | Performed by: INTERNAL MEDICINE

## 2021-09-13 PROCEDURE — 99999 PR PBB SHADOW E&M-EST. PATIENT-LVL III: CPT | Mod: PBBFAC,,, | Performed by: INTERNAL MEDICINE

## 2021-09-13 PROCEDURE — 3008F BODY MASS INDEX DOCD: CPT | Mod: CPTII,S$GLB,, | Performed by: INTERNAL MEDICINE

## 2021-09-13 PROCEDURE — 3078F PR MOST RECENT DIASTOLIC BLOOD PRESSURE < 80 MM HG: ICD-10-PCS | Mod: CPTII,S$GLB,, | Performed by: INTERNAL MEDICINE

## 2021-09-13 PROCEDURE — 36415 COLL VENOUS BLD VENIPUNCTURE: CPT | Mod: PO | Performed by: INTERNAL MEDICINE

## 2021-09-13 PROCEDURE — 3074F SYST BP LT 130 MM HG: CPT | Mod: CPTII,S$GLB,, | Performed by: INTERNAL MEDICINE

## 2021-09-13 PROCEDURE — 3288F FALL RISK ASSESSMENT DOCD: CPT | Mod: CPTII,S$GLB,, | Performed by: INTERNAL MEDICINE

## 2021-09-13 PROCEDURE — 1126F AMNT PAIN NOTED NONE PRSNT: CPT | Mod: CPTII,S$GLB,, | Performed by: INTERNAL MEDICINE

## 2021-09-13 PROCEDURE — 3288F PR FALLS RISK ASSESSMENT DOCUMENTED: ICD-10-PCS | Mod: CPTII,S$GLB,, | Performed by: INTERNAL MEDICINE

## 2021-09-13 PROCEDURE — 99214 OFFICE O/P EST MOD 30 MIN: CPT | Mod: S$GLB,,, | Performed by: INTERNAL MEDICINE

## 2021-09-13 PROCEDURE — 3078F DIAST BP <80 MM HG: CPT | Mod: CPTII,S$GLB,, | Performed by: INTERNAL MEDICINE

## 2021-09-13 RX ORDER — SOTALOL HYDROCHLORIDE 120 MG/1
TABLET ORAL
Qty: 180 TABLET | Refills: 1 | Status: SHIPPED | OUTPATIENT
Start: 2021-09-13 | End: 2022-03-14

## 2021-09-14 LAB — HGB BLD-MCNC: 17.5 G/DL (ref 14–18)

## 2021-09-20 ENCOUNTER — TELEPHONE (OUTPATIENT)
Dept: CARDIOLOGY | Facility: CLINIC | Age: 71
End: 2021-09-20

## 2021-12-14 ENCOUNTER — TELEPHONE (OUTPATIENT)
Dept: CARDIOLOGY | Facility: CLINIC | Age: 71
End: 2021-12-14
Payer: MEDICARE

## 2022-06-02 ENCOUNTER — OFFICE VISIT (OUTPATIENT)
Dept: CARDIOLOGY | Facility: CLINIC | Age: 72
End: 2022-06-02
Payer: MEDICARE

## 2022-06-02 ENCOUNTER — LAB VISIT (OUTPATIENT)
Dept: LAB | Facility: HOSPITAL | Age: 72
End: 2022-06-02
Attending: INTERNAL MEDICINE
Payer: MEDICARE

## 2022-06-02 VITALS
HEART RATE: 70 BPM | DIASTOLIC BLOOD PRESSURE: 78 MMHG | BODY MASS INDEX: 38.67 KG/M2 | SYSTOLIC BLOOD PRESSURE: 133 MMHG | HEIGHT: 71 IN | WEIGHT: 276.25 LBS

## 2022-06-02 DIAGNOSIS — I48.0 PAF (PAROXYSMAL ATRIAL FIBRILLATION): ICD-10-CM

## 2022-06-02 DIAGNOSIS — Z79.899 LONG TERM CURRENT USE OF ANTIARRHYTHMIC DRUG: ICD-10-CM

## 2022-06-02 DIAGNOSIS — I34.0 NON-RHEUMATIC MITRAL REGURGITATION: Chronic | ICD-10-CM

## 2022-06-02 DIAGNOSIS — Z79.01 LONG TERM (CURRENT) USE OF ANTICOAGULANTS: Chronic | ICD-10-CM

## 2022-06-02 DIAGNOSIS — I10 ESSENTIAL HYPERTENSION: Chronic | ICD-10-CM

## 2022-06-02 DIAGNOSIS — I25.10 MILD CAD: ICD-10-CM

## 2022-06-02 DIAGNOSIS — I10 ESSENTIAL HYPERTENSION: ICD-10-CM

## 2022-06-02 DIAGNOSIS — E78.00 HYPERCHOLESTEREMIA: ICD-10-CM

## 2022-06-02 DIAGNOSIS — R94.30 ELEVATED LEFT VENTRICULAR END-DIASTOLIC PRESSURE (LVEDP): Chronic | ICD-10-CM

## 2022-06-02 DIAGNOSIS — I48.0 PAF (PAROXYSMAL ATRIAL FIBRILLATION): Primary | Chronic | ICD-10-CM

## 2022-06-02 DIAGNOSIS — Z79.01 LONG TERM (CURRENT) USE OF ANTICOAGULANTS: ICD-10-CM

## 2022-06-02 DIAGNOSIS — Z79.899 LONG TERM CURRENT USE OF ANTIARRHYTHMIC DRUG: Chronic | ICD-10-CM

## 2022-06-02 DIAGNOSIS — E66.01 SEVERE OBESITY (BMI 35.0-39.9) WITH COMORBIDITY: Chronic | ICD-10-CM

## 2022-06-02 DIAGNOSIS — E78.00 HYPERCHOLESTEREMIA: Chronic | ICD-10-CM

## 2022-06-02 DIAGNOSIS — I25.10 MILD CAD: Chronic | ICD-10-CM

## 2022-06-02 LAB
ALBUMIN SERPL BCP-MCNC: 3.6 G/DL (ref 3.5–5.2)
ALP SERPL-CCNC: 54 U/L (ref 55–135)
ALT SERPL W/O P-5'-P-CCNC: 26 U/L (ref 10–44)
ANION GAP SERPL CALC-SCNC: 6 MMOL/L (ref 8–16)
AST SERPL-CCNC: 24 U/L (ref 10–40)
BASOPHILS # BLD AUTO: 0.06 K/UL (ref 0–0.2)
BASOPHILS NFR BLD: 0.9 % (ref 0–1.9)
BILIRUB SERPL-MCNC: 0.6 MG/DL (ref 0.1–1)
BUN SERPL-MCNC: 15 MG/DL (ref 8–23)
CALCIUM SERPL-MCNC: 8.9 MG/DL (ref 8.7–10.5)
CHLORIDE SERPL-SCNC: 99 MMOL/L (ref 95–110)
CO2 SERPL-SCNC: 30 MMOL/L (ref 23–29)
CREAT SERPL-MCNC: 0.8 MG/DL (ref 0.5–1.4)
DIFFERENTIAL METHOD: ABNORMAL
EOSINOPHIL # BLD AUTO: 0.3 K/UL (ref 0–0.5)
EOSINOPHIL NFR BLD: 3.7 % (ref 0–8)
ERYTHROCYTE [DISTWIDTH] IN BLOOD BY AUTOMATED COUNT: 13.5 % (ref 11.5–14.5)
EST. GFR  (AFRICAN AMERICAN): >60 ML/MIN/1.73 M^2
EST. GFR  (NON AFRICAN AMERICAN): >60 ML/MIN/1.73 M^2
GLUCOSE SERPL-MCNC: 80 MG/DL (ref 70–110)
HCT VFR BLD AUTO: 51.7 % (ref 40–54)
HGB BLD-MCNC: 16.9 G/DL (ref 14–18)
IMM GRANULOCYTES # BLD AUTO: 0.03 K/UL (ref 0–0.04)
IMM GRANULOCYTES NFR BLD AUTO: 0.4 % (ref 0–0.5)
LYMPHOCYTES # BLD AUTO: 1.6 K/UL (ref 1–4.8)
LYMPHOCYTES NFR BLD: 23.8 % (ref 18–48)
MCH RBC QN AUTO: 29.6 PG (ref 27–31)
MCHC RBC AUTO-ENTMCNC: 32.7 G/DL (ref 32–36)
MCV RBC AUTO: 91 FL (ref 82–98)
MONOCYTES # BLD AUTO: 1 K/UL (ref 0.3–1)
MONOCYTES NFR BLD: 15.4 % (ref 4–15)
NEUTROPHILS # BLD AUTO: 3.7 K/UL (ref 1.8–7.7)
NEUTROPHILS NFR BLD: 55.8 % (ref 38–73)
NRBC BLD-RTO: 0 /100 WBC
PLATELET # BLD AUTO: 182 K/UL (ref 150–450)
PMV BLD AUTO: 10.6 FL (ref 9.2–12.9)
POTASSIUM SERPL-SCNC: 4.5 MMOL/L (ref 3.5–5.1)
PROT SERPL-MCNC: 6.8 G/DL (ref 6–8.4)
RBC # BLD AUTO: 5.7 M/UL (ref 4.6–6.2)
SODIUM SERPL-SCNC: 135 MMOL/L (ref 136–145)
WBC # BLD AUTO: 6.71 K/UL (ref 3.9–12.7)

## 2022-06-02 PROCEDURE — 1126F PR PAIN SEVERITY QUANTIFIED, NO PAIN PRESENT: ICD-10-PCS | Mod: CPTII,S$GLB,, | Performed by: INTERNAL MEDICINE

## 2022-06-02 PROCEDURE — 1126F AMNT PAIN NOTED NONE PRSNT: CPT | Mod: CPTII,S$GLB,, | Performed by: INTERNAL MEDICINE

## 2022-06-02 PROCEDURE — 3008F PR BODY MASS INDEX (BMI) DOCUMENTED: ICD-10-PCS | Mod: CPTII,S$GLB,, | Performed by: INTERNAL MEDICINE

## 2022-06-02 PROCEDURE — 1159F PR MEDICATION LIST DOCUMENTED IN MEDICAL RECORD: ICD-10-PCS | Mod: CPTII,S$GLB,, | Performed by: INTERNAL MEDICINE

## 2022-06-02 PROCEDURE — 3288F FALL RISK ASSESSMENT DOCD: CPT | Mod: CPTII,S$GLB,, | Performed by: INTERNAL MEDICINE

## 2022-06-02 PROCEDURE — 80053 COMPREHEN METABOLIC PANEL: CPT | Performed by: INTERNAL MEDICINE

## 2022-06-02 PROCEDURE — 1101F PR PT FALLS ASSESS DOC 0-1 FALLS W/OUT INJ PAST YR: ICD-10-PCS | Mod: CPTII,S$GLB,, | Performed by: INTERNAL MEDICINE

## 2022-06-02 PROCEDURE — 99214 OFFICE O/P EST MOD 30 MIN: CPT | Mod: S$GLB,,, | Performed by: INTERNAL MEDICINE

## 2022-06-02 PROCEDURE — 99999 PR PBB SHADOW E&M-EST. PATIENT-LVL III: CPT | Mod: PBBFAC,,, | Performed by: INTERNAL MEDICINE

## 2022-06-02 PROCEDURE — 3075F SYST BP GE 130 - 139MM HG: CPT | Mod: CPTII,S$GLB,, | Performed by: INTERNAL MEDICINE

## 2022-06-02 PROCEDURE — 3288F PR FALLS RISK ASSESSMENT DOCUMENTED: ICD-10-PCS | Mod: CPTII,S$GLB,, | Performed by: INTERNAL MEDICINE

## 2022-06-02 PROCEDURE — 99214 PR OFFICE/OUTPT VISIT, EST, LEVL IV, 30-39 MIN: ICD-10-PCS | Mod: S$GLB,,, | Performed by: INTERNAL MEDICINE

## 2022-06-02 PROCEDURE — 3008F BODY MASS INDEX DOCD: CPT | Mod: CPTII,S$GLB,, | Performed by: INTERNAL MEDICINE

## 2022-06-02 PROCEDURE — 1159F MED LIST DOCD IN RCRD: CPT | Mod: CPTII,S$GLB,, | Performed by: INTERNAL MEDICINE

## 2022-06-02 PROCEDURE — 99999 PR PBB SHADOW E&M-EST. PATIENT-LVL III: ICD-10-PCS | Mod: PBBFAC,,, | Performed by: INTERNAL MEDICINE

## 2022-06-02 PROCEDURE — 85025 COMPLETE CBC W/AUTO DIFF WBC: CPT | Performed by: INTERNAL MEDICINE

## 2022-06-02 PROCEDURE — 1101F PT FALLS ASSESS-DOCD LE1/YR: CPT | Mod: CPTII,S$GLB,, | Performed by: INTERNAL MEDICINE

## 2022-06-02 PROCEDURE — 36415 COLL VENOUS BLD VENIPUNCTURE: CPT | Mod: PO | Performed by: INTERNAL MEDICINE

## 2022-06-02 PROCEDURE — 3078F PR MOST RECENT DIASTOLIC BLOOD PRESSURE < 80 MM HG: ICD-10-PCS | Mod: CPTII,S$GLB,, | Performed by: INTERNAL MEDICINE

## 2022-06-02 PROCEDURE — 3075F PR MOST RECENT SYSTOLIC BLOOD PRESS GE 130-139MM HG: ICD-10-PCS | Mod: CPTII,S$GLB,, | Performed by: INTERNAL MEDICINE

## 2022-06-02 PROCEDURE — 3078F DIAST BP <80 MM HG: CPT | Mod: CPTII,S$GLB,, | Performed by: INTERNAL MEDICINE

## 2022-06-02 RX ORDER — PRAVASTATIN SODIUM 10 MG/1
10 TABLET ORAL DAILY
Qty: 90 TABLET | Refills: 1 | Status: SHIPPED | OUTPATIENT
Start: 2022-06-02 | End: 2024-03-08 | Stop reason: SDUPTHER

## 2022-07-13 ENCOUNTER — TELEPHONE (OUTPATIENT)
Dept: CARDIOLOGY | Facility: CLINIC | Age: 72
End: 2022-07-13
Payer: MEDICARE

## 2023-02-12 NOTE — PROGRESS NOTES
Subjective:    Patient ID:  Yonathan Hernandez Sr. is a 72 y.o. male who presents for PAF (paroxysmal atrial fibrillation), Hypertension, and Valvular Heart Disease        HPI  LABS FROM JUNE CMP AND CBC OK, REPEAT LABS AT PCP, HAD KNEE SURGERY, , ALSO DX WITH WET MACULAR DEGENERATION OVERALL DOING OK NO CHEST PAIN NO SIGNIFICANT SHORTNESS OF BREATH SOME DISTANT EXERTION NO TIA TYPE SYMPTOMS NO NEAR-SYNCOPE, SEE REVIEW OF SYSTEMS    Past Medical History:   Diagnosis Date    Atrial fibrillation     Edema     Enlarged prostate      Past Surgical History:   Procedure Laterality Date    ANGIOGRAM, CORONARY, WITH LEFT HEART CATHETERIZATION  10/1/2019    Procedure: Angiogram, Coronary, with Left Heart Cath;  Surgeon: Mica Tafoya MD;  Location: [facility] CATH;  Service: Cardiology;;    CARDIAC CATHETERIZATION       Family History   Problem Relation Age of Onset    Lung cancer Father      Social History     Socioeconomic History    Marital status:    Tobacco Use    Smoking status: Never    Smokeless tobacco: Never   Substance and Sexual Activity    Alcohol use: Not Currently    Drug use: Not Currently       Review of patient's allergies indicates:   Allergen Reactions    Lidocaine hcl        Current Outpatient Medications:     furosemide (LASIX) 20 MG tablet, Take 20 mg by mouth once daily., Disp: , Rfl:     furosemide (LASIX) 40 MG tablet, TAKE 1 TABLET(40 MG) BY MOUTH EVERY DAY, Disp: 90 tablet, Rfl: 0    potassium chloride SA (K-DUR,KLOR-CON) 10 MEQ tablet, TAKE 1 TABLET BY MOUTH EVERY OTHER DAY, Disp: 90 tablet, Rfl: 1    pravastatin (PRAVACHOL) 10 MG tablet, Take 1 tablet (10 mg total) by mouth once daily., Disp: 90 tablet, Rfl: 1    sotaloL (BETAPACE) 120 MG Tab, TAKE 1 TABLET BY MOUTH EVERY 12 HOURS, Disp: 180 tablet, Rfl: 1    tamsulosin (FLOMAX) 0.4 mg Cp24, TK 1 C PO QD, Disp: , Rfl: 1    XARELTO 20 mg Tab, TAKE 1 TABLET BY MOUTH EVERY DAY WITH EVENING MEAL(DINNER), Disp: 90 tablet, Rfl: 1    spironolactone  "(ALDACTONE) 25 MG tablet, Take 0.5 tablets (12.5 mg total) by mouth once daily., Disp: 45 tablet, Rfl: 0    Review of Systems   Constitutional: Positive for malaise/fatigue. Negative for chills, diaphoresis, fever, night sweats and weight loss.   HENT:  Negative for congestion and hoarse voice.    Eyes:  Negative for blurred vision and visual disturbance.   Cardiovascular:  Negative for chest pain, claudication, cyanosis, dyspnea on exertion (MINIMAL), irregular heartbeat, leg swelling, near-syncope, orthopnea, palpitations, paroxysmal nocturnal dyspnea and syncope.   Respiratory:  Negative for cough, hemoptysis, shortness of breath and wheezing.    Hematologic/Lymphatic: Negative for adenopathy. Does not bruise/bleed easily.   Skin:  Negative for color change and rash.   Musculoskeletal:  Positive for back pain (CHRONIC). Negative for falls and joint pain. Muscle weakness: LEGS FROM BACK.  Gastrointestinal:  Negative for abdominal pain, change in bowel habit, dysphagia, heartburn, jaundice, melena and nausea.   Genitourinary:  Negative for dysuria and flank pain.   Neurological:  Negative for brief paralysis, focal weakness, headaches, light-headedness, loss of balance, paresthesias and weakness.   Psychiatric/Behavioral:  Negative for altered mental status and depression.    Allergic/Immunologic: Negative for hives and persistent infections.      Objective:      Vitals:    02/13/23 0954   BP: 129/74   Pulse: 69   Weight: 122.3 kg (269 lb 10 oz)   Height: 5' 11" (1.803 m)   PainSc: 0-No pain     Body mass index is 37.6 kg/m².    Physical Exam  Constitutional:       Appearance: He is well-developed. He is obese.   HENT:      Head: Normocephalic and atraumatic.   Eyes:      Extraocular Movements: Extraocular movements intact.      Conjunctiva/sclera: Conjunctivae normal.   Neck:      Vascular: Normal carotid pulses. No carotid bruit or JVD.   Cardiovascular:      Rate and Rhythm: Normal rate and regular rhythm. No " extrasystoles are present.     Pulses:           Carotid pulses are 2+ on the right side and 2+ on the left side.       Radial pulses are 2+ on the right side and 2+ on the left side.        Femoral pulses are 2+ on the right side and 2+ on the left side.       Posterior tibial pulses are 2+ on the right side and 2+ on the left side.      Heart sounds: Murmur heard.   Systolic murmur is present with a grade of 1/6 at the lower left sternal border.     No gallop.   Pulmonary:      Effort: Pulmonary effort is normal.      Breath sounds: Normal breath sounds and air entry. No rales.   Abdominal:      Palpations: Abdomen is soft. There is no hepatomegaly.      Tenderness: There is no abdominal tenderness.   Musculoskeletal:         General: Normal range of motion.      Cervical back: Neck supple.      Right lower leg: No edema.      Left lower leg: Edema (TRACE) present.   Skin:     General: Skin is warm and dry.      Capillary Refill: Capillary refill takes less than 2 seconds.   Neurological:      General: No focal deficit present.      Mental Status: He is alert and oriented to person, place, and time.      Cranial Nerves: No cranial nerve deficit.   Psychiatric:         Mood and Affect: Mood normal.         Speech: Speech normal.         Behavior: Behavior normal.               ..    Chemistry        Component Value Date/Time     (L) 06/02/2022 1100    K 4.5 06/02/2022 1100    CL 99 06/02/2022 1100    CO2 30 (H) 06/02/2022 1100    BUN 15 06/02/2022 1100    CREATININE 0.8 06/02/2022 1100    GLU 80 06/02/2022 1100        Component Value Date/Time    CALCIUM 8.9 06/02/2022 1100    ALKPHOS 54 (L) 06/02/2022 1100    AST 24 06/02/2022 1100    ALT 26 06/02/2022 1100    BILITOT 0.6 06/02/2022 1100    ESTGFRAFRICA >60.0 06/02/2022 1100    EGFRNONAA >60.0 06/02/2022 1100            ..  Lab Results   Component Value Date    CHOL 137 12/02/2020     Lab Results   Component Value Date    HDL 39 (L) 12/02/2020     Lab  Results   Component Value Date    LDLCALC 64.2 12/02/2020     Lab Results   Component Value Date    TRIG 169 (H) 12/02/2020     Lab Results   Component Value Date    CHOLHDL 28.5 12/02/2020     ..  Lab Results   Component Value Date    WBC 6.71 06/02/2022    HGB 16.9 06/02/2022    HCT 51.7 06/02/2022    MCV 91 06/02/2022     06/02/2022       Test(s) Reviewed  I have reviewed the following in detail:  [] Stress test   [] Angiography   [] Echocardiogram   [x] Labs   [] Other:       Assessment:         ICD-10-CM ICD-9-CM   1. PAF (paroxysmal atrial fibrillation)  I48.0 427.31   2. Mild CAD  I25.10 414.00   3. Long term current use of antiarrhythmic drug  Z79.899 V58.69   4. Hypercholesteremia  E78.00 272.0   5. Essential hypertension  I10 401.9   6. Long term (current) use of anticoagulants  Z79.01 V58.61   7. Severe obesity (BMI 35.0-39.9) with comorbidity  E66.01 278.01   8. Non-rheumatic mitral regurgitation  I34.0 424.0     Problem List Items Addressed This Visit          Cardiac/Vascular    PAF (paroxysmal atrial fibrillation) - Primary    Relevant Orders    Echo    Holter monitor - 24 hour    Comprehensive Metabolic Panel    CBC Auto Differential    Long term current use of antiarrhythmic drug    Relevant Orders    Holter monitor - 24 hour    Essential hypertension    Relevant Orders    Comprehensive Metabolic Panel    Non-rheumatic mitral regurgitation    Relevant Orders    Echo    Mild CAD    Relevant Orders    Echo    Hypercholesteremia    Relevant Orders    Comprehensive Metabolic Panel       Hematology    Long term (current) use of anticoagulants    Relevant Orders    Comprehensive Metabolic Panel    CBC Auto Differential       Endocrine    Severe obesity (BMI 35.0-39.9) with comorbidity        Plan:     CHECK ECHO, HOLTER, GET LABS FROM PCP,ALL CV CLINICALLY STABLE, CLASS 1 ANGINA, NO HF, NO TIA, ASSESS CLINICAL ARRHYTHMIA,CONTINUE CURRENT MEDS, EDUCATION, DIET, EXERCISE, WEIGHT LOSS  RTC IN 6-8 MO  WITH LABS      PAF (paroxysmal atrial fibrillation)  -     Echo  -     Holter monitor - 24 hour; Future  -     Comprehensive Metabolic Panel; Future; Expected date: 08/13/2023  -     CBC Auto Differential; Future; Expected date: 08/13/2023    Mild CAD  -     Echo    Long term current use of antiarrhythmic drug  -     Holter monitor - 24 hour; Future    Hypercholesteremia  -     Comprehensive Metabolic Panel; Future; Expected date: 08/13/2023    Essential hypertension  -     Comprehensive Metabolic Panel; Future; Expected date: 08/13/2023    Long term (current) use of anticoagulants  -     Comprehensive Metabolic Panel; Future; Expected date: 08/13/2023  -     CBC Auto Differential; Future; Expected date: 08/13/2023    Severe obesity (BMI 35.0-39.9) with comorbidity    Non-rheumatic mitral regurgitation  -     Echo    RTC Low level/low impact aerobic exercise 5x's/wk. Heart healthy diet and risk factor modification.    See labs and med orders.    Aerobic exercise 5x's/wk. Heart healthy diet and risk factor modification.    See labs and med orders.

## 2023-02-13 ENCOUNTER — OFFICE VISIT (OUTPATIENT)
Dept: CARDIOLOGY | Facility: CLINIC | Age: 73
End: 2023-02-13
Payer: MEDICARE

## 2023-02-13 VITALS
BODY MASS INDEX: 37.75 KG/M2 | SYSTOLIC BLOOD PRESSURE: 129 MMHG | WEIGHT: 269.63 LBS | DIASTOLIC BLOOD PRESSURE: 74 MMHG | HEIGHT: 71 IN | HEART RATE: 69 BPM

## 2023-02-13 DIAGNOSIS — I48.0 PAF (PAROXYSMAL ATRIAL FIBRILLATION): Primary | ICD-10-CM

## 2023-02-13 DIAGNOSIS — I10 ESSENTIAL HYPERTENSION: Chronic | ICD-10-CM

## 2023-02-13 DIAGNOSIS — E66.01 SEVERE OBESITY (BMI 35.0-39.9) WITH COMORBIDITY: Chronic | ICD-10-CM

## 2023-02-13 DIAGNOSIS — I25.10 MILD CAD: ICD-10-CM

## 2023-02-13 DIAGNOSIS — Z79.899 LONG TERM CURRENT USE OF ANTIARRHYTHMIC DRUG: Chronic | ICD-10-CM

## 2023-02-13 DIAGNOSIS — I34.0 NON-RHEUMATIC MITRAL REGURGITATION: ICD-10-CM

## 2023-02-13 DIAGNOSIS — E78.00 HYPERCHOLESTEREMIA: Chronic | ICD-10-CM

## 2023-02-13 DIAGNOSIS — Z79.01 LONG TERM (CURRENT) USE OF ANTICOAGULANTS: Chronic | ICD-10-CM

## 2023-02-13 PROCEDURE — 99999 PR PBB SHADOW E&M-EST. PATIENT-LVL III: CPT | Mod: PBBFAC,,, | Performed by: INTERNAL MEDICINE

## 2023-02-13 PROCEDURE — 99999 PR PBB SHADOW E&M-EST. PATIENT-LVL III: ICD-10-PCS | Mod: PBBFAC,,, | Performed by: INTERNAL MEDICINE

## 2023-02-13 PROCEDURE — 1101F PR PT FALLS ASSESS DOC 0-1 FALLS W/OUT INJ PAST YR: ICD-10-PCS | Mod: CPTII,S$GLB,, | Performed by: INTERNAL MEDICINE

## 2023-02-13 PROCEDURE — 1126F AMNT PAIN NOTED NONE PRSNT: CPT | Mod: CPTII,S$GLB,, | Performed by: INTERNAL MEDICINE

## 2023-02-13 PROCEDURE — 3288F FALL RISK ASSESSMENT DOCD: CPT | Mod: CPTII,S$GLB,, | Performed by: INTERNAL MEDICINE

## 2023-02-13 PROCEDURE — 3288F PR FALLS RISK ASSESSMENT DOCUMENTED: ICD-10-PCS | Mod: CPTII,S$GLB,, | Performed by: INTERNAL MEDICINE

## 2023-02-13 PROCEDURE — 1159F PR MEDICATION LIST DOCUMENTED IN MEDICAL RECORD: ICD-10-PCS | Mod: CPTII,S$GLB,, | Performed by: INTERNAL MEDICINE

## 2023-02-13 PROCEDURE — 3078F DIAST BP <80 MM HG: CPT | Mod: CPTII,S$GLB,, | Performed by: INTERNAL MEDICINE

## 2023-02-13 PROCEDURE — 1159F MED LIST DOCD IN RCRD: CPT | Mod: CPTII,S$GLB,, | Performed by: INTERNAL MEDICINE

## 2023-02-13 PROCEDURE — 3074F SYST BP LT 130 MM HG: CPT | Mod: CPTII,S$GLB,, | Performed by: INTERNAL MEDICINE

## 2023-02-13 PROCEDURE — 99214 PR OFFICE/OUTPT VISIT, EST, LEVL IV, 30-39 MIN: ICD-10-PCS | Mod: S$GLB,,, | Performed by: INTERNAL MEDICINE

## 2023-02-13 PROCEDURE — 3074F PR MOST RECENT SYSTOLIC BLOOD PRESSURE < 130 MM HG: ICD-10-PCS | Mod: CPTII,S$GLB,, | Performed by: INTERNAL MEDICINE

## 2023-02-13 PROCEDURE — 1101F PT FALLS ASSESS-DOCD LE1/YR: CPT | Mod: CPTII,S$GLB,, | Performed by: INTERNAL MEDICINE

## 2023-02-13 PROCEDURE — 3008F PR BODY MASS INDEX (BMI) DOCUMENTED: ICD-10-PCS | Mod: CPTII,S$GLB,, | Performed by: INTERNAL MEDICINE

## 2023-02-13 PROCEDURE — 1126F PR PAIN SEVERITY QUANTIFIED, NO PAIN PRESENT: ICD-10-PCS | Mod: CPTII,S$GLB,, | Performed by: INTERNAL MEDICINE

## 2023-02-13 PROCEDURE — 3008F BODY MASS INDEX DOCD: CPT | Mod: CPTII,S$GLB,, | Performed by: INTERNAL MEDICINE

## 2023-02-13 PROCEDURE — 99214 OFFICE O/P EST MOD 30 MIN: CPT | Mod: S$GLB,,, | Performed by: INTERNAL MEDICINE

## 2023-02-13 PROCEDURE — 3078F PR MOST RECENT DIASTOLIC BLOOD PRESSURE < 80 MM HG: ICD-10-PCS | Mod: CPTII,S$GLB,, | Performed by: INTERNAL MEDICINE

## 2023-03-22 ENCOUNTER — CLINICAL SUPPORT (OUTPATIENT)
Dept: CARDIOLOGY | Facility: HOSPITAL | Age: 73
End: 2023-03-22
Attending: INTERNAL MEDICINE
Payer: MEDICARE

## 2023-03-22 VITALS — HEIGHT: 71 IN | WEIGHT: 269 LBS | BODY MASS INDEX: 37.66 KG/M2

## 2023-03-22 DIAGNOSIS — I48.0 PAF (PAROXYSMAL ATRIAL FIBRILLATION): ICD-10-CM

## 2023-03-22 DIAGNOSIS — Z79.899 LONG TERM CURRENT USE OF ANTIARRHYTHMIC DRUG: ICD-10-CM

## 2023-03-22 LAB
ASCENDING AORTA: 4.11 CM
AV INDEX (PROSTH): 1.05
AV MEAN GRADIENT: 2 MMHG
AV PEAK GRADIENT: 4 MMHG
AV VALVE AREA: 4.07 CM2
AV VELOCITY RATIO: 0.93
BSA FOR ECHO PROCEDURE: 2.47 M2
CV ECHO LV RWT: 0.39 CM
DOP CALC AO PEAK VEL: 0.94 M/S
DOP CALC AO VTI: 19.1 CM
DOP CALC LVOT AREA: 3.9 CM2
DOP CALC LVOT DIAMETER: 2.22 CM
DOP CALC LVOT PEAK VEL: 0.87 M/S
DOP CALC LVOT STROKE VOLUME: 77.76 CM3
DOP CALCLVOT PEAK VEL VTI: 20.1 CM
E WAVE DECELERATION TIME: 194.83 MSEC
E/A RATIO: 0.87
E/E' RATIO: 9.08 M/S
ECHO LV POSTERIOR WALL: 0.97 CM (ref 0.6–1.1)
EJECTION FRACTION: 60 %
FRACTIONAL SHORTENING: 29 % (ref 28–44)
INTERVENTRICULAR SEPTUM: 1.06 CM (ref 0.6–1.1)
IVRT: 125.59 MSEC
LA MAJOR: 5.5 CM
LA MINOR: 4.54 CM
LA WIDTH: 3.9 CM
LEFT ATRIUM SIZE: 3.56 CM
LEFT ATRIUM VOLUME INDEX: 24.6 ML/M2
LEFT ATRIUM VOLUME: 58.7 CM3
LEFT INTERNAL DIMENSION IN SYSTOLE: 3.52 CM (ref 2.1–4)
LEFT VENTRICLE DIASTOLIC VOLUME INDEX: 48.32 ML/M2
LEFT VENTRICLE DIASTOLIC VOLUME: 115.48 ML
LEFT VENTRICLE MASS INDEX: 76 G/M2
LEFT VENTRICLE SYSTOLIC VOLUME INDEX: 21.6 ML/M2
LEFT VENTRICLE SYSTOLIC VOLUME: 51.53 ML
LEFT VENTRICULAR INTERNAL DIMENSION IN DIASTOLE: 4.95 CM (ref 3.5–6)
LEFT VENTRICULAR MASS: 182.63 G
LV LATERAL E/E' RATIO: 8.43 M/S
LV SEPTAL E/E' RATIO: 9.83 M/S
LVOT MG: 1.78 MMHG
LVOT MV: 0.63 CM/S
MV PEAK A VEL: 0.68 M/S
MV PEAK E VEL: 0.59 M/S
MV STENOSIS PRESSURE HALF TIME: 56.5 MS
MV VALVE AREA P 1/2 METHOD: 3.89 CM2
PISA TR MAX VEL: 1.88 M/S
PULM VEIN S/D RATIO: 1.13
PV PEAK D VEL: 0.38 M/S
PV PEAK S VEL: 0.43 M/S
RA MAJOR: 5.79 CM
RA PRESSURE: 3 MMHG
RIGHT VENTRICULAR END-DIASTOLIC DIMENSION: 3.12 CM
RIGHT VENTRICULAR LENGTH IN DIASTOLE (APICAL 4-CHAMBER VIEW): 7.51 CM
RV MID DIAMA: 23.26 CM
RV TISSUE DOPPLER FREE WALL SYSTOLIC VELOCITY 1 (APICAL 4 CHAMBER VIEW): 0.01 CM/S
SINUS: 3.63 CM
STJ: 3.44 CM
TDI LATERAL: 0.07 M/S
TDI SEPTAL: 0.06 M/S
TDI: 0.07 M/S
TR MAX PG: 14 MMHG
TRICUSPID ANNULAR PLANE SYSTOLIC EXCURSION: 2.38 CM
TV REST PULMONARY ARTERY PRESSURE: 17 MMHG

## 2023-03-22 PROCEDURE — 93306 TTE W/DOPPLER COMPLETE: CPT | Mod: PO

## 2023-03-22 PROCEDURE — 93227 HOLTER MONITOR - 24 HOUR (CUPID ONLY): ICD-10-PCS | Mod: ,,, | Performed by: INTERNAL MEDICINE

## 2023-03-22 PROCEDURE — 93227 XTRNL ECG REC<48 HR R&I: CPT | Mod: ,,, | Performed by: INTERNAL MEDICINE

## 2023-03-22 PROCEDURE — 93225 XTRNL ECG REC<48 HRS REC: CPT | Mod: PO

## 2023-03-27 LAB
OHS CV EVENT MONITOR DAY: 0
OHS CV HOLTER LENGTH DECIMAL HOURS: 25.78
OHS CV HOLTER LENGTH HOURS: 25
OHS CV HOLTER LENGTH MINUTES: 47
OHS CV HOLTER SINUS AVERAGE HR: 109
OHS CV HOLTER SINUS MAX HR: 78
OHS CV HOLTER SINUS MIN HR: 62

## 2023-06-27 DIAGNOSIS — I48.0 PAF (PAROXYSMAL ATRIAL FIBRILLATION): ICD-10-CM

## 2023-06-27 RX ORDER — RIVAROXABAN 20 MG/1
TABLET, FILM COATED ORAL
Qty: 90 TABLET | Refills: 1 | Status: SHIPPED | OUTPATIENT
Start: 2023-06-27 | End: 2024-02-02

## 2023-08-14 ENCOUNTER — LAB VISIT (OUTPATIENT)
Dept: LAB | Facility: HOSPITAL | Age: 73
End: 2023-08-14
Attending: INTERNAL MEDICINE
Payer: MEDICARE

## 2023-08-14 DIAGNOSIS — I48.0 PAF (PAROXYSMAL ATRIAL FIBRILLATION): Chronic | ICD-10-CM

## 2023-08-14 DIAGNOSIS — E78.00 HYPERCHOLESTEREMIA: Chronic | ICD-10-CM

## 2023-08-14 DIAGNOSIS — Z79.01 LONG TERM (CURRENT) USE OF ANTICOAGULANTS: Chronic | ICD-10-CM

## 2023-08-14 DIAGNOSIS — I10 ESSENTIAL HYPERTENSION: Chronic | ICD-10-CM

## 2023-08-14 LAB
ALBUMIN SERPL BCP-MCNC: 3.4 G/DL (ref 3.5–5.2)
ALP SERPL-CCNC: 52 U/L (ref 55–135)
ALT SERPL W/O P-5'-P-CCNC: 20 U/L (ref 10–44)
ANION GAP SERPL CALC-SCNC: 5 MMOL/L (ref 8–16)
AST SERPL-CCNC: 20 U/L (ref 10–40)
BASOPHILS # BLD AUTO: 0.07 K/UL (ref 0–0.2)
BASOPHILS NFR BLD: 1 % (ref 0–1.9)
BILIRUB SERPL-MCNC: 0.7 MG/DL (ref 0.1–1)
BUN SERPL-MCNC: 14 MG/DL (ref 8–23)
CALCIUM SERPL-MCNC: 8.6 MG/DL (ref 8.7–10.5)
CHLORIDE SERPL-SCNC: 102 MMOL/L (ref 95–110)
CO2 SERPL-SCNC: 33 MMOL/L (ref 23–29)
CREAT SERPL-MCNC: 0.8 MG/DL (ref 0.5–1.4)
DIFFERENTIAL METHOD: NORMAL
EOSINOPHIL # BLD AUTO: 0.3 K/UL (ref 0–0.5)
EOSINOPHIL NFR BLD: 4.4 % (ref 0–8)
ERYTHROCYTE [DISTWIDTH] IN BLOOD BY AUTOMATED COUNT: 13.8 % (ref 11.5–14.5)
EST. GFR  (NO RACE VARIABLE): >60 ML/MIN/1.73 M^2
GLUCOSE SERPL-MCNC: 89 MG/DL (ref 70–110)
HCT VFR BLD AUTO: 51.5 % (ref 40–54)
HGB BLD-MCNC: 16.6 G/DL (ref 14–18)
IMM GRANULOCYTES # BLD AUTO: 0.02 K/UL (ref 0–0.04)
IMM GRANULOCYTES NFR BLD AUTO: 0.3 % (ref 0–0.5)
LYMPHOCYTES # BLD AUTO: 1.7 K/UL (ref 1–4.8)
LYMPHOCYTES NFR BLD: 23.2 % (ref 18–48)
MCH RBC QN AUTO: 29.7 PG (ref 27–31)
MCHC RBC AUTO-ENTMCNC: 32.2 G/DL (ref 32–36)
MCV RBC AUTO: 92 FL (ref 82–98)
MONOCYTES # BLD AUTO: 1 K/UL (ref 0.3–1)
MONOCYTES NFR BLD: 14.1 % (ref 4–15)
NEUTROPHILS # BLD AUTO: 4.1 K/UL (ref 1.8–7.7)
NEUTROPHILS NFR BLD: 57 % (ref 38–73)
NRBC BLD-RTO: 0 /100 WBC
PLATELET # BLD AUTO: 177 K/UL (ref 150–450)
PMV BLD AUTO: 11.2 FL (ref 9.2–12.9)
POTASSIUM SERPL-SCNC: 5 MMOL/L (ref 3.5–5.1)
PROT SERPL-MCNC: 6.5 G/DL (ref 6–8.4)
RBC # BLD AUTO: 5.59 M/UL (ref 4.6–6.2)
SODIUM SERPL-SCNC: 140 MMOL/L (ref 136–145)
WBC # BLD AUTO: 7.1 K/UL (ref 3.9–12.7)

## 2023-08-14 PROCEDURE — 80053 COMPREHEN METABOLIC PANEL: CPT | Performed by: INTERNAL MEDICINE

## 2023-08-14 PROCEDURE — 36415 COLL VENOUS BLD VENIPUNCTURE: CPT | Mod: PO | Performed by: INTERNAL MEDICINE

## 2023-08-14 PROCEDURE — 85025 COMPLETE CBC W/AUTO DIFF WBC: CPT | Performed by: INTERNAL MEDICINE

## 2023-08-23 NOTE — PROGRESS NOTES
Subjective:    Patient ID:  Yonathan Hernandez Sr. is a 72 y.o. male who presents for Follow-up (PAF (paroxysmal atrial fibrillation))        HPI  DISCUSSED LABS AND GOALS CMP AND CBC OK ALBUMIN 3.4, ECHO TECHNICALLY DIFFICULT STUDY NORMAL LV FUNCTION MILD TR MILDLY DILATED ASCENDING AORTA, DOING WELL,SOME [place], ?  COPD PER PCP BEFORE,  SEE ROS    Past Medical History:   Diagnosis Date    Atrial fibrillation     Edema     Enlarged prostate      Past Surgical History:   Procedure Laterality Date    ANGIOGRAM, CORONARY, WITH LEFT HEART CATHETERIZATION  10/1/2019    Procedure: Angiogram, Coronary, with Left Heart Cath;  Surgeon: Mica Tafoya MD;  Location: [facility] CATH;  Service: Cardiology;;    CARDIAC CATHETERIZATION       Family History   Problem Relation Age of Onset    Lung cancer Father      Social History     Socioeconomic History    Marital status:    Tobacco Use    Smoking status: Never    Smokeless tobacco: Never   Substance and Sexual Activity    Alcohol use: Not Currently    Drug use: Not Currently       Review of patient's allergies indicates:   Allergen Reactions    Lidocaine hcl        Current Outpatient Medications:     furosemide (LASIX) 20 MG tablet, Take 20 mg by mouth once daily., Disp: , Rfl:     potassium chloride SA (K-DUR,KLOR-CON) 10 MEQ tablet, TAKE 1 TABLET BY MOUTH EVERY OTHER DAY, Disp: 90 tablet, Rfl: 1    pravastatin (PRAVACHOL) 10 MG tablet, Take 1 tablet (10 mg total) by mouth once daily., Disp: 90 tablet, Rfl: 1    sotaloL (BETAPACE) 120 MG Tab, TAKE 1 TABLET BY MOUTH EVERY 12 HOURS, Disp: 180 tablet, Rfl: 1    spironolactone (ALDACTONE) 25 MG tablet, Take 0.5 tablets (12.5 mg total) by mouth once daily., Disp: 45 tablet, Rfl: 0    tamsulosin (FLOMAX) 0.4 mg Cp24, TK 1 C PO QD, Disp: , Rfl: 1    XARELTO 20 mg Tab, TAKE 1 TABLET BY MOUTH EVERY DAY WITH THE EVENING MEAL(DINNER), Disp: 90 tablet, Rfl: 1    Review of Systems   Constitutional: Negative for chills, diaphoresis, fever,  "malaise/fatigue, night sweats and weight loss.   HENT:  Negative for congestion and hoarse voice.    Eyes:  Negative for blurred vision and visual disturbance.   Cardiovascular:  Positive for dyspnea on exertion (MILD). Negative for chest pain, claudication, cyanosis, irregular heartbeat, leg swelling, near-syncope, orthopnea, palpitations, paroxysmal nocturnal dyspnea and syncope.   Respiratory:  Negative for cough, hemoptysis, shortness of breath and wheezing.    Hematologic/Lymphatic: Negative for adenopathy. Does not bruise/bleed easily.   Skin:  Negative for color change and rash.   Musculoskeletal:  Positive for back pain (CHRONIC). Negative for falls and joint pain. Muscle weakness: LEGS FROM BACK.  Gastrointestinal:  Negative for abdominal pain, change in bowel habit, dysphagia, heartburn, jaundice, melena and nausea.   Genitourinary:  Negative for dysuria and flank pain.   Neurological:  Negative for brief paralysis, focal weakness, headaches, light-headedness, loss of balance, numbness and weakness.   Psychiatric/Behavioral:  Negative for altered mental status and depression.    Allergic/Immunologic: Negative for persistent infections.        Objective:      Vitals:    08/24/23 1305   BP: 126/76   Pulse: 70   Weight: 122.5 kg (270 lb 1 oz)   Height: 5' 11" (1.803 m)   PainSc: 0-No pain     Body mass index is 37.67 kg/m².    Physical Exam  Constitutional:       Appearance: He is well-developed. He is obese.   HENT:      Head: Normocephalic and atraumatic.   Eyes:      Extraocular Movements: Extraocular movements intact.      Conjunctiva/sclera: Conjunctivae normal.   Neck:      Vascular: Normal carotid pulses. No JVD.   Cardiovascular:      Rate and Rhythm: Normal rate and regular rhythm. No extrasystoles are present.     Pulses:           Carotid pulses are 2+ on the right side and 2+ on the left side.       Radial pulses are 2+ on the right side and 2+ on the left side.        Femoral pulses are 2+ on the " right side and 2+ on the left side.       Posterior tibial pulses are 2+ on the right side and 2+ on the left side.      Heart sounds: Murmur heard.      Systolic murmur is present with a grade of 1/6.      No gallop.   Pulmonary:      Effort: Pulmonary effort is normal. Prolonged expiration present.      Breath sounds: Normal breath sounds and air entry. No rales.   Abdominal:      Palpations: Abdomen is soft. There is no hepatomegaly.      Tenderness: There is no abdominal tenderness.   Musculoskeletal:         General: Normal range of motion.      Cervical back: Neck supple.      Right lower leg: Edema (TRACE) present.      Left lower leg: Edema (TRACE) present.   Skin:     General: Skin is warm and dry.      Capillary Refill: Capillary refill takes less than 2 seconds.   Neurological:      General: No focal deficit present.      Mental Status: He is alert and oriented to person, place, and time.      Cranial Nerves: No cranial nerve deficit.   Psychiatric:         Mood and Affect: Mood normal.         Speech: Speech normal.         Behavior: Behavior normal.                 ..    Chemistry        Component Value Date/Time     08/14/2023 1148    K 5.0 08/14/2023 1148     08/14/2023 1148    CO2 33 (H) 08/14/2023 1148    BUN 14 08/14/2023 1148    CREATININE 0.8 08/14/2023 1148    GLU 89 08/14/2023 1148        Component Value Date/Time    CALCIUM 8.6 (L) 08/14/2023 1148    ALKPHOS 52 (L) 08/14/2023 1148    AST 20 08/14/2023 1148    ALT 20 08/14/2023 1148    BILITOT 0.7 08/14/2023 1148    ESTGFRAFRICA >60.0 06/02/2022 1100    EGFRNONAA >60.0 06/02/2022 1100            ..  Lab Results   Component Value Date    CHOL 137 12/02/2020     Lab Results   Component Value Date    HDL 39 (L) 12/02/2020     Lab Results   Component Value Date    LDLCALC 64.2 12/02/2020     Lab Results   Component Value Date    TRIG 169 (H) 12/02/2020     Lab Results   Component Value Date    CHOLHDL 28.5 12/02/2020     ..  Lab Results    Component Value Date    WBC 7.10 08/14/2023    HGB 16.6 08/14/2023    HCT 51.5 08/14/2023    MCV 92 08/14/2023     08/14/2023       Test(s) Reviewed  I have reviewed the following in detail:  [] Stress test   [] Angiography   [x] Echocardiogram   [x] Labs   [] Other:       Assessment:         ICD-10-CM ICD-9-CM   1. PAF (paroxysmal atrial fibrillation)  I48.0 427.31   2. Long term current use of antiarrhythmic drug  Z79.899 V58.69   3. Mild CAD  I25.10 414.00   4. Claudication  I73.9 443.9   5. Non-rheumatic mitral regurgitation  I34.0 424.0   6. Long term (current) use of anticoagulants  Z79.01 V58.61   7. Severe obesity (BMI 35.0-39.9) with comorbidity  E66.01 278.01   8. Mild ascending aorta dilatation  I77.810 447.71   9. Essential hypertension  I10 401.9   10. Hypercholesteremia  E78.00 272.0     Problem List Items Addressed This Visit          Cardiac/Vascular    PAF (paroxysmal atrial fibrillation) - Primary    Relevant Orders    Comprehensive Metabolic Panel    EKG 12-lead    Long term current use of antiarrhythmic drug    Relevant Orders    Comprehensive Metabolic Panel    EKG 12-lead    Essential hypertension    Relevant Orders    X-Ray Chest PA And Lateral    Comprehensive Metabolic Panel    Claudication in peripheral vascular disease    Non-rheumatic mitral regurgitation    Mild CAD    Relevant Orders    Lipid Panel    Hypercholesteremia    Relevant Orders    Comprehensive Metabolic Panel    Lipid Panel    Mild ascending aorta dilatation    Relevant Orders    X-Ray Chest PA And Lateral       Hematology    Long term (current) use of anticoagulants       Endocrine    Severe obesity (BMI 35.0-39.9) with comorbidity    Relevant Orders    Hemoglobin        Plan:     CXR TODAY, QUESTIONABLE COPD,ALL CV CLINICALLY STABLE, NO ANGINA, NO HF, NO TIA, NO CLINICAL ARRHYTHMIA,CONTINUE CURRENT MEDS, EDUCATION, DIET, EXERCISE , WEIGHT LOSS RETURN TO CLINIC IN 6 MO WITH LABS AND EKG      PAF (paroxysmal atrial  fibrillation)  -     Comprehensive Metabolic Panel; Future; Expected date: 02/24/2024  -     EKG 12-lead; Future; Expected date: 02/24/2024    Long term current use of antiarrhythmic drug  -     Comprehensive Metabolic Panel; Future; Expected date: 02/24/2024  -     EKG 12-lead; Future; Expected date: 02/24/2024    Mild CAD  -     Lipid Panel; Future; Expected date: 02/24/2024    Claudication  Comments:  MORE PSEUDOCALUDICATION    Non-rheumatic mitral regurgitation  Comments:  MINOR    Long term (current) use of anticoagulants    Severe obesity (BMI 35.0-39.9) with comorbidity  -     Hemoglobin; Future; Expected date: 02/24/2024    Mild ascending aorta dilatation  -     X-Ray Chest PA And Lateral; Future; Expected date: 08/24/2023    Essential hypertension  Comments:  CONTROLLED  Orders:  -     X-Ray Chest PA And Lateral; Future; Expected date: 08/24/2023  -     Comprehensive Metabolic Panel; Future; Expected date: 02/24/2024    Hypercholesteremia  -     Comprehensive Metabolic Panel; Future; Expected date: 02/24/2024  -     Lipid Panel; Future; Expected date: 02/24/2024    RTC Low level/low impact aerobic exercise 5x's/wk. Heart healthy diet and risk factor modification.    See labs and med orders.    Aerobic exercise 5x's/wk. Heart healthy diet and risk factor modification.    See labs and med orders.

## 2023-08-24 ENCOUNTER — HOSPITAL ENCOUNTER (OUTPATIENT)
Dept: RADIOLOGY | Facility: HOSPITAL | Age: 73
Discharge: HOME | End: 2023-08-24
Attending: INTERNAL MEDICINE
Payer: MEDICARE

## 2023-08-24 ENCOUNTER — OFFICE VISIT (OUTPATIENT)
Dept: CARDIOLOGY | Facility: CLINIC | Age: 73
End: 2023-08-24
Payer: MEDICARE

## 2023-08-24 VITALS
WEIGHT: 270.06 LBS | HEIGHT: 71 IN | BODY MASS INDEX: 37.81 KG/M2 | SYSTOLIC BLOOD PRESSURE: 126 MMHG | DIASTOLIC BLOOD PRESSURE: 76 MMHG | HEART RATE: 70 BPM

## 2023-08-24 DIAGNOSIS — I77.810 MILD ASCENDING AORTA DILATATION: ICD-10-CM

## 2023-08-24 DIAGNOSIS — I48.0 PAF (PAROXYSMAL ATRIAL FIBRILLATION): Primary | Chronic | ICD-10-CM

## 2023-08-24 DIAGNOSIS — I73.9 CLAUDICATION: ICD-10-CM

## 2023-08-24 DIAGNOSIS — E66.01 SEVERE OBESITY (BMI 35.0-39.9) WITH COMORBIDITY: Chronic | ICD-10-CM

## 2023-08-24 DIAGNOSIS — Z79.899 LONG TERM CURRENT USE OF ANTIARRHYTHMIC DRUG: Chronic | ICD-10-CM

## 2023-08-24 DIAGNOSIS — I25.10 MILD CAD: Chronic | ICD-10-CM

## 2023-08-24 DIAGNOSIS — Z79.01 LONG TERM (CURRENT) USE OF ANTICOAGULANTS: ICD-10-CM

## 2023-08-24 DIAGNOSIS — I10 ESSENTIAL HYPERTENSION: Chronic | ICD-10-CM

## 2023-08-24 DIAGNOSIS — E78.00 HYPERCHOLESTEREMIA: Chronic | ICD-10-CM

## 2023-08-24 DIAGNOSIS — I10 ESSENTIAL HYPERTENSION: ICD-10-CM

## 2023-08-24 DIAGNOSIS — I34.0 NON-RHEUMATIC MITRAL REGURGITATION: Chronic | ICD-10-CM

## 2023-08-24 DIAGNOSIS — I77.810 MILD ASCENDING AORTA DILATATION: Chronic | ICD-10-CM

## 2023-08-24 PROCEDURE — 3078F PR MOST RECENT DIASTOLIC BLOOD PRESSURE < 80 MM HG: ICD-10-PCS | Mod: CPTII,S$GLB,, | Performed by: INTERNAL MEDICINE

## 2023-08-24 PROCEDURE — 71046 X-RAY EXAM CHEST 2 VIEWS: CPT | Mod: 26,,, | Performed by: RADIOLOGY

## 2023-08-24 PROCEDURE — 3288F FALL RISK ASSESSMENT DOCD: CPT | Mod: CPTII,S$GLB,, | Performed by: INTERNAL MEDICINE

## 2023-08-24 PROCEDURE — 1126F AMNT PAIN NOTED NONE PRSNT: CPT | Mod: CPTII,S$GLB,, | Performed by: INTERNAL MEDICINE

## 2023-08-24 PROCEDURE — 1101F PR PT FALLS ASSESS DOC 0-1 FALLS W/OUT INJ PAST YR: ICD-10-PCS | Mod: CPTII,S$GLB,, | Performed by: INTERNAL MEDICINE

## 2023-08-24 PROCEDURE — 3008F BODY MASS INDEX DOCD: CPT | Mod: CPTII,S$GLB,, | Performed by: INTERNAL MEDICINE

## 2023-08-24 PROCEDURE — 3074F SYST BP LT 130 MM HG: CPT | Mod: CPTII,S$GLB,, | Performed by: INTERNAL MEDICINE

## 2023-08-24 PROCEDURE — 99999 PR PBB SHADOW E&M-EST. PATIENT-LVL III: ICD-10-PCS | Mod: PBBFAC,,, | Performed by: INTERNAL MEDICINE

## 2023-08-24 PROCEDURE — 71046 XR CHEST PA AND LATERAL: ICD-10-PCS | Mod: 26,,, | Performed by: RADIOLOGY

## 2023-08-24 PROCEDURE — 1101F PT FALLS ASSESS-DOCD LE1/YR: CPT | Mod: CPTII,S$GLB,, | Performed by: INTERNAL MEDICINE

## 2023-08-24 PROCEDURE — 99214 PR OFFICE/OUTPT VISIT, EST, LEVL IV, 30-39 MIN: ICD-10-PCS | Mod: S$GLB,,, | Performed by: INTERNAL MEDICINE

## 2023-08-24 PROCEDURE — 3008F PR BODY MASS INDEX (BMI) DOCUMENTED: ICD-10-PCS | Mod: CPTII,S$GLB,, | Performed by: INTERNAL MEDICINE

## 2023-08-24 PROCEDURE — 99999 PR PBB SHADOW E&M-EST. PATIENT-LVL III: CPT | Mod: PBBFAC,,, | Performed by: INTERNAL MEDICINE

## 2023-08-24 PROCEDURE — 3078F DIAST BP <80 MM HG: CPT | Mod: CPTII,S$GLB,, | Performed by: INTERNAL MEDICINE

## 2023-08-24 PROCEDURE — 1159F PR MEDICATION LIST DOCUMENTED IN MEDICAL RECORD: ICD-10-PCS | Mod: CPTII,S$GLB,, | Performed by: INTERNAL MEDICINE

## 2023-08-24 PROCEDURE — 99214 OFFICE O/P EST MOD 30 MIN: CPT | Mod: S$GLB,,, | Performed by: INTERNAL MEDICINE

## 2023-08-24 PROCEDURE — 3288F PR FALLS RISK ASSESSMENT DOCUMENTED: ICD-10-PCS | Mod: CPTII,S$GLB,, | Performed by: INTERNAL MEDICINE

## 2023-08-24 PROCEDURE — 71046 X-RAY EXAM CHEST 2 VIEWS: CPT | Mod: TC,FY,PO

## 2023-08-24 PROCEDURE — 1126F PR PAIN SEVERITY QUANTIFIED, NO PAIN PRESENT: ICD-10-PCS | Mod: CPTII,S$GLB,, | Performed by: INTERNAL MEDICINE

## 2023-08-24 PROCEDURE — 3074F PR MOST RECENT SYSTOLIC BLOOD PRESSURE < 130 MM HG: ICD-10-PCS | Mod: CPTII,S$GLB,, | Performed by: INTERNAL MEDICINE

## 2023-08-24 PROCEDURE — 1159F MED LIST DOCD IN RCRD: CPT | Mod: CPTII,S$GLB,, | Performed by: INTERNAL MEDICINE

## 2023-10-25 DIAGNOSIS — I48.0 PAF (PAROXYSMAL ATRIAL FIBRILLATION): ICD-10-CM

## 2023-10-25 RX ORDER — SOTALOL HYDROCHLORIDE 120 MG/1
TABLET ORAL
Qty: 180 TABLET | Refills: 1 | Status: SHIPPED | OUTPATIENT
Start: 2023-10-25 | End: 2024-03-08 | Stop reason: SDUPTHER

## 2024-02-02 DIAGNOSIS — I48.0 PAF (PAROXYSMAL ATRIAL FIBRILLATION): ICD-10-CM

## 2024-02-02 RX ORDER — RIVAROXABAN 20 MG/1
TABLET, FILM COATED ORAL
Qty: 90 TABLET | Refills: 1 | Status: SHIPPED | OUTPATIENT
Start: 2024-02-02

## 2024-02-27 ENCOUNTER — LAB VISIT (OUTPATIENT)
Dept: LAB | Facility: HOSPITAL | Age: 74
End: 2024-02-27
Attending: INTERNAL MEDICINE
Payer: MEDICARE

## 2024-02-27 DIAGNOSIS — I48.0 PAF (PAROXYSMAL ATRIAL FIBRILLATION): Chronic | ICD-10-CM

## 2024-02-27 DIAGNOSIS — E78.00 HYPERCHOLESTEREMIA: Chronic | ICD-10-CM

## 2024-02-27 DIAGNOSIS — I25.10 MILD CAD: Chronic | ICD-10-CM

## 2024-02-27 DIAGNOSIS — I10 ESSENTIAL HYPERTENSION: Chronic | ICD-10-CM

## 2024-02-27 DIAGNOSIS — E66.01 SEVERE OBESITY (BMI 35.0-39.9) WITH COMORBIDITY: Chronic | ICD-10-CM

## 2024-02-27 DIAGNOSIS — Z79.899 LONG TERM CURRENT USE OF ANTIARRHYTHMIC DRUG: Chronic | ICD-10-CM

## 2024-02-27 LAB
ALBUMIN SERPL BCP-MCNC: 3.6 G/DL (ref 3.5–5.2)
ALP SERPL-CCNC: 50 U/L (ref 55–135)
ALT SERPL W/O P-5'-P-CCNC: 26 U/L (ref 10–44)
ANION GAP SERPL CALC-SCNC: 8 MMOL/L (ref 8–16)
AST SERPL-CCNC: 21 U/L (ref 10–40)
BILIRUB SERPL-MCNC: 0.9 MG/DL (ref 0.1–1)
BUN SERPL-MCNC: 18 MG/DL (ref 8–23)
CALCIUM SERPL-MCNC: 9.4 MG/DL (ref 8.7–10.5)
CHLORIDE SERPL-SCNC: 102 MMOL/L (ref 95–110)
CHOLEST SERPL-MCNC: 154 MG/DL (ref 120–199)
CHOLEST/HDLC SERPL: 4.1 {RATIO} (ref 2–5)
CO2 SERPL-SCNC: 32 MMOL/L (ref 23–29)
CREAT SERPL-MCNC: 1 MG/DL (ref 0.5–1.4)
EST. GFR  (NO RACE VARIABLE): >60 ML/MIN/1.73 M^2
GLUCOSE SERPL-MCNC: 103 MG/DL (ref 70–110)
HDLC SERPL-MCNC: 38 MG/DL (ref 40–75)
HDLC SERPL: 24.7 % (ref 20–50)
HGB BLD-MCNC: 17 G/DL (ref 14–18)
LDLC SERPL CALC-MCNC: 87.4 MG/DL (ref 63–159)
NONHDLC SERPL-MCNC: 116 MG/DL
POTASSIUM SERPL-SCNC: 4.8 MMOL/L (ref 3.5–5.1)
PROT SERPL-MCNC: 7.1 G/DL (ref 6–8.4)
SODIUM SERPL-SCNC: 142 MMOL/L (ref 136–145)
TRIGL SERPL-MCNC: 143 MG/DL (ref 30–150)

## 2024-02-27 PROCEDURE — 36415 COLL VENOUS BLD VENIPUNCTURE: CPT | Mod: PO | Performed by: INTERNAL MEDICINE

## 2024-02-27 PROCEDURE — 80053 COMPREHEN METABOLIC PANEL: CPT | Performed by: INTERNAL MEDICINE

## 2024-02-27 PROCEDURE — 85018 HEMOGLOBIN: CPT | Performed by: INTERNAL MEDICINE

## 2024-02-27 PROCEDURE — 80061 LIPID PANEL: CPT | Performed by: INTERNAL MEDICINE

## 2024-03-03 PROBLEM — I87.319: Status: ACTIVE | Noted: 2024-03-03

## 2024-03-08 ENCOUNTER — OFFICE VISIT (OUTPATIENT)
Dept: CARDIOLOGY | Facility: CLINIC | Age: 74
End: 2024-03-08
Payer: MEDICARE

## 2024-03-08 VITALS
BODY MASS INDEX: 37.87 KG/M2 | WEIGHT: 270.5 LBS | HEART RATE: 74 BPM | DIASTOLIC BLOOD PRESSURE: 86 MMHG | HEIGHT: 71 IN | SYSTOLIC BLOOD PRESSURE: 130 MMHG

## 2024-03-08 DIAGNOSIS — I77.810 MILD ASCENDING AORTA DILATATION: ICD-10-CM

## 2024-03-08 DIAGNOSIS — R94.30 ELEVATED LEFT VENTRICULAR END-DIASTOLIC PRESSURE (LVEDP): Chronic | ICD-10-CM

## 2024-03-08 DIAGNOSIS — I48.0 PAF (PAROXYSMAL ATRIAL FIBRILLATION): Primary | Chronic | ICD-10-CM

## 2024-03-08 DIAGNOSIS — I25.10 MILD CAD: Chronic | ICD-10-CM

## 2024-03-08 DIAGNOSIS — I87.319 CHRONIC VENOUS HYPERTENSION (IDIOPATHIC) WITH ULCER OF UNSPECIFIED LOWER EXTREMITY (CODE): Chronic | ICD-10-CM

## 2024-03-08 DIAGNOSIS — Z79.899 LONG TERM CURRENT USE OF ANTIARRHYTHMIC DRUG: Chronic | ICD-10-CM

## 2024-03-08 DIAGNOSIS — E66.01 SEVERE OBESITY (BMI 35.0-39.9) WITH COMORBIDITY: Chronic | ICD-10-CM

## 2024-03-08 DIAGNOSIS — Z79.01 LONG TERM (CURRENT) USE OF ANTICOAGULANTS: ICD-10-CM

## 2024-03-08 DIAGNOSIS — E78.00 HYPERCHOLESTEREMIA: ICD-10-CM

## 2024-03-08 LAB
OHS QRS DURATION: 158 MS
OHS QTC CALCULATION: 452 MS

## 2024-03-08 PROCEDURE — 3079F DIAST BP 80-89 MM HG: CPT | Mod: CPTII,S$GLB,, | Performed by: INTERNAL MEDICINE

## 2024-03-08 PROCEDURE — 93010 ELECTROCARDIOGRAM REPORT: CPT | Mod: S$GLB,,, | Performed by: INTERNAL MEDICINE

## 2024-03-08 PROCEDURE — 3075F SYST BP GE 130 - 139MM HG: CPT | Mod: CPTII,S$GLB,, | Performed by: INTERNAL MEDICINE

## 2024-03-08 PROCEDURE — 99999 PR PBB SHADOW E&M-EST. PATIENT-LVL III: CPT | Mod: PBBFAC,,, | Performed by: INTERNAL MEDICINE

## 2024-03-08 PROCEDURE — 99214 OFFICE O/P EST MOD 30 MIN: CPT | Mod: S$GLB,,, | Performed by: INTERNAL MEDICINE

## 2024-03-08 PROCEDURE — 1126F AMNT PAIN NOTED NONE PRSNT: CPT | Mod: CPTII,S$GLB,, | Performed by: INTERNAL MEDICINE

## 2024-03-08 PROCEDURE — 1101F PT FALLS ASSESS-DOCD LE1/YR: CPT | Mod: CPTII,S$GLB,, | Performed by: INTERNAL MEDICINE

## 2024-03-08 PROCEDURE — 93005 ELECTROCARDIOGRAM TRACING: CPT | Mod: PO

## 2024-03-08 PROCEDURE — 3288F FALL RISK ASSESSMENT DOCD: CPT | Mod: CPTII,S$GLB,, | Performed by: INTERNAL MEDICINE

## 2024-03-08 PROCEDURE — 1159F MED LIST DOCD IN RCRD: CPT | Mod: CPTII,S$GLB,, | Performed by: INTERNAL MEDICINE

## 2024-03-08 RX ORDER — SOTALOL HYDROCHLORIDE 120 MG/1
120 TABLET ORAL EVERY 12 HOURS
Qty: 180 TABLET | Refills: 1 | Status: SHIPPED | OUTPATIENT
Start: 2024-03-08

## 2024-03-08 RX ORDER — PRAVASTATIN SODIUM 10 MG/1
10 TABLET ORAL DAILY
Qty: 90 TABLET | Refills: 1 | Status: SHIPPED | OUTPATIENT
Start: 2024-03-08 | End: 2025-03-08

## 2024-03-08 RX ORDER — CLOTRIMAZOLE AND BETAMETHASONE DIPROPIONATE 10; .64 MG/G; MG/G
CREAM TOPICAL
COMMUNITY
Start: 2024-02-09

## 2024-03-08 NOTE — PROGRESS NOTES
Subjective:    Patient ID:  Yonathan Hernandez Sr. is a 73 y.o. male who presents for Follow-up and Results        HPI  DISCUSSED LABS AND GOALS CMP OK, LDL 87 UP FROM 64, HDL 38, HB 17, STARTED DIETING,NOT TAKING STATIN, HE SAID HE HAD BAD THINGS ABOUT THEM, SEE ROS    Past Medical History:   Diagnosis Date    Atrial fibrillation     Edema     Enlarged prostate      Past Surgical History:   Procedure Laterality Date    ANGIOGRAM, CORONARY, WITH LEFT HEART CATHETERIZATION  10/1/2019    Procedure: Angiogram, Coronary, with Left Heart Cath;  Surgeon: Mica aTfoya MD;  Location: [facility] CATH;  Service: Cardiology;;    CARDIAC CATHETERIZATION       Family History   Problem Relation Age of Onset    Lung cancer Father      Social History     Socioeconomic History    Marital status:    Tobacco Use    Smoking status: Never    Smokeless tobacco: Never   Substance and Sexual Activity    Alcohol use: Not Currently    Drug use: Not Currently       Review of patient's allergies indicates:   Allergen Reactions    Cpm-pseudoephed-acetaminophen Other (See Comments)     wheezing    Lidocaine hcl        Current Outpatient Medications:     clotrimazole-betamethasone 1-0.05% (LOTRISONE) cream, APPLY TOPICALLY TO THE AFFECTED AND SURROUNDING AREAS TWICE DAILY IN THE MORNING AND IN THE EVENING, Disp: , Rfl:     furosemide (LASIX) 20 MG tablet, Take 20 mg by mouth once daily., Disp: , Rfl:     potassium chloride SA (K-DUR,KLOR-CON) 10 MEQ tablet, TAKE 1 TABLET BY MOUTH EVERY OTHER DAY, Disp: 90 tablet, Rfl: 1    spironolactone (ALDACTONE) 25 MG tablet, Take 0.5 tablets (12.5 mg total) by mouth once daily., Disp: 45 tablet, Rfl: 0    tamsulosin (FLOMAX) 0.4 mg Cp24, TK 1 C PO QD, Disp: , Rfl: 1    XARELTO 20 mg Tab, TAKE ONE TABLET BY MOUTH EVERY DAY WITH THE EVENING MEAL, Disp: 90 tablet, Rfl: 1    pravastatin (PRAVACHOL) 10 MG tablet, Take 1 tablet (10 mg total) by mouth once daily., Disp: 90 tablet, Rfl: 1    sotaloL (BETAPACE)  "120 MG Tab, Take 1 tablet (120 mg total) by mouth every 12 (twelve) hours., Disp: 180 tablet, Rfl: 1    Review of Systems   Constitutional: Negative for chills, diaphoresis, fever, malaise/fatigue, night sweats and weight gain.   HENT:  Positive for congestion. Negative for hoarse voice.    Eyes:  Negative for blurred vision and visual disturbance.   Cardiovascular:  Negative for chest pain, claudication, cyanosis, dyspnea on exertion (MILD, HEAVY LIFTING), irregular heartbeat, leg swelling, near-syncope, orthopnea, palpitations (RARE), paroxysmal nocturnal dyspnea and syncope.   Respiratory:  Negative for cough, hemoptysis, shortness of breath and wheezing.    Hematologic/Lymphatic: Negative for adenopathy. Does not bruise/bleed easily.   Skin:  Negative for color change and rash.   Musculoskeletal:  Negative for back pain (CHRONIC), falls and joint pain. Muscle weakness: LEGS FROM BACK.  Gastrointestinal:  Negative for abdominal pain, change in bowel habit, dysphagia, heartburn, jaundice, melena and nausea.   Genitourinary:  Negative for dysuria and flank pain.   Neurological:  Negative for brief paralysis, focal weakness, headaches, light-headedness, loss of balance, paresthesias and weakness.   Psychiatric/Behavioral:  Negative for altered mental status and depression.    Allergic/Immunologic: Negative for hives and persistent infections.        Objective:      Vitals:    03/08/24 1108   BP: 130/86   Pulse: 74   Weight: 122.7 kg (270 lb 8.1 oz)   Height: 5' 11" (1.803 m)   PainSc: 0-No pain     Body mass index is 37.73 kg/m².    Physical Exam  Constitutional:       Appearance: He is well-developed. He is obese.   HENT:      Head: Normocephalic and atraumatic.   Eyes:      General: No scleral icterus.     Extraocular Movements: Extraocular movements intact.   Neck:      Vascular: Normal carotid pulses. No JVD.   Cardiovascular:      Rate and Rhythm: Normal rate and regular rhythm. No extrasystoles are present.    "  Pulses:           Carotid pulses are 2+ on the right side and 2+ on the left side.       Radial pulses are 2+ on the right side and 2+ on the left side.        Posterior tibial pulses are 2+ on the right side and 2+ on the left side.      Heart sounds: Murmur heard.      Systolic murmur is present with a grade of 1/6 at the lower left sternal border.      No gallop.   Pulmonary:      Effort: Pulmonary effort is normal.      Breath sounds: Normal breath sounds and air entry.   Abdominal:      Palpations: Abdomen is soft. There is no hepatomegaly.      Tenderness: There is no abdominal tenderness.   Musculoskeletal:         General: Normal range of motion.      Cervical back: Neck supple.      Right lower leg: Edema (TRACE) present.      Left lower leg: Edema (TRACE) present.   Skin:     General: Skin is warm and dry.      Capillary Refill: Capillary refill takes less than 2 seconds.   Neurological:      General: No focal deficit present.      Mental Status: He is alert and oriented to person, place, and time.      Cranial Nerves: No cranial nerve deficit.   Psychiatric:         Mood and Affect: Mood normal.         Speech: Speech normal.         Behavior: Behavior normal.                 ..    Chemistry        Component Value Date/Time     02/27/2024 0759    K 4.8 02/27/2024 0759     02/27/2024 0759    CO2 32 (H) 02/27/2024 0759    BUN 18 02/27/2024 0759    CREATININE 1.0 02/27/2024 0759     02/27/2024 0759        Component Value Date/Time    CALCIUM 9.4 02/27/2024 0759    ALKPHOS 50 (L) 02/27/2024 0759    AST 21 02/27/2024 0759    ALT 26 02/27/2024 0759    BILITOT 0.9 02/27/2024 0759    ESTGFRAFRICA >60.0 06/02/2022 1100    EGFRNONAA >60.0 06/02/2022 1100            ..  Lab Results   Component Value Date    CHOL 154 02/27/2024    CHOL 137 12/02/2020     Lab Results   Component Value Date    HDL 38 (L) 02/27/2024    HDL 39 (L) 12/02/2020     Lab Results   Component Value Date    LDLCALC 87.4  02/27/2024    LDLCALC 64.2 12/02/2020     Lab Results   Component Value Date    TRIG 143 02/27/2024    TRIG 169 (H) 12/02/2020     Lab Results   Component Value Date    CHOLHDL 24.7 02/27/2024    CHOLHDL 28.5 12/02/2020     ..  Lab Results   Component Value Date    WBC 7.10 08/14/2023    HGB 17.0 02/27/2024    HCT 51.5 08/14/2023    MCV 92 08/14/2023     08/14/2023       Test(s) Reviewed  I have reviewed the following in detail:  [] Stress test   [] Angiography   [] Echocardiogram   [x] Labs   [] Other:       Assessment:         ICD-10-CM ICD-9-CM   1. PAF (paroxysmal atrial fibrillation)  I48.0 427.31   2. Long term current use of antiarrhythmic drug  Z79.899 V58.69   3. Mild CAD  I25.10 414.00   4. Severe obesity (BMI 35.0-39.9) with comorbidity  E66.01 278.01   5. Chronic venous hypertension (idiopathic) with ulcer of unspecified lower extremity (CODE)  I87.319 459.31   6. Elevated left ventricular end-diastolic pressure (LVEDP)  R94.30 794.30   7. Mild ascending aorta dilatation  I77.810 447.71   8. Hypercholesteremia  E78.00 272.0   9. Long term (current) use of anticoagulants  Z79.01 V58.61     Problem List Items Addressed This Visit          Cardiac/Vascular    PAF (paroxysmal atrial fibrillation) - Primary    Relevant Medications    sotaloL (BETAPACE) 120 MG Tab    Other Relevant Orders    IN OFFICE EKG 12-LEAD (to [place])    Comprehensive Metabolic Panel    Long term current use of antiarrhythmic drug    Relevant Orders    Comprehensive Metabolic Panel    Hemoglobin    Elevated left ventricular end-diastolic pressure (LVEDP)    Mild CAD    Relevant Orders    Comprehensive Metabolic Panel    Hypercholesteremia    Mild ascending aorta dilatation       Hematology    Long term (current) use of anticoagulants    Relevant Orders    Comprehensive Metabolic Panel    Hemoglobin       Endocrine    Severe obesity (BMI 35.0-39.9) with comorbidity       Other    Chronic venous hypertension (idiopathic) with ulcer  of unspecified lower extremity (CODE)        Plan:     EKG SR,RBBB, , RESTART PRAVACHOL, EDUCATION, ALL CV OTHERWISE STABLE NO ANGINA NO CLINICAL ARRHYTHMIA OR WHERE, NO OVERT HEART FAILURE DIET WALKING EXERCISE WEIGHT LOSS RETURN TO CLINIC IN 6 MO      PAF (paroxysmal atrial fibrillation)  -     EKG 12-lead  -     IN OFFICE EKG 12-LEAD (to [facility])  -     sotaloL (BETAPACE) 120 MG Tab; Take 1 tablet (120 mg total) by mouth every 12 (twelve) hours.  Dispense: 180 tablet; Refill: 1  -     Comprehensive Metabolic Panel; Future; Expected date: 09/08/2024    Long term current use of antiarrhythmic drug  -     EKG 12-lead  -     Comprehensive Metabolic Panel; Future; Expected date: 09/08/2024  -     Hemoglobin; Future; Expected date: 09/08/2024    Mild CAD  -     Comprehensive Metabolic Panel; Future; Expected date: 09/08/2024    Severe obesity (BMI 35.0-39.9) with comorbidity    Chronic venous hypertension (idiopathic) with ulcer of unspecified lower extremity (CODE)    Elevated left ventricular end-diastolic pressure (LVEDP)    Mild ascending aorta dilatation    Hypercholesteremia    Long term (current) use of anticoagulants  -     Comprehensive Metabolic Panel; Future; Expected date: 09/08/2024  -     Hemoglobin; Future; Expected date: 09/08/2024    Other orders  -     pravastatin (PRAVACHOL) 10 MG tablet; Take 1 tablet (10 mg total) by mouth once daily.  Dispense: 90 tablet; Refill: 1    RTC Low level/low impact aerobic exercise 5x's/wk. Heart healthy diet and risk factor modification.    See labs and med orders.    Aerobic exercise 5x's/wk. Heart healthy diet and risk factor modification.    See labs and med orders.

## 2024-04-17 PROBLEM — J40 BRONCHITIS: Status: ACTIVE | Noted: 2024-04-17

## 2024-04-17 PROBLEM — J98.01 BRONCHOSPASM: Status: ACTIVE | Noted: 2024-04-17

## 2024-04-17 PROBLEM — R05.9 COUGH: Status: ACTIVE | Noted: 2024-04-17

## 2024-04-29 PROBLEM — G47.10 EXCESSIVE SLEEPINESS: Status: ACTIVE | Noted: 2024-04-29

## 2024-09-09 ENCOUNTER — LAB VISIT (OUTPATIENT)
Dept: LAB | Facility: HOSPITAL | Age: 74
End: 2024-09-09
Attending: INTERNAL MEDICINE
Payer: MEDICARE

## 2024-09-09 DIAGNOSIS — I25.10 MILD CAD: Chronic | ICD-10-CM

## 2024-09-09 DIAGNOSIS — Z79.01 LONG TERM (CURRENT) USE OF ANTICOAGULANTS: ICD-10-CM

## 2024-09-09 DIAGNOSIS — Z79.899 LONG TERM CURRENT USE OF ANTIARRHYTHMIC DRUG: Chronic | ICD-10-CM

## 2024-09-09 DIAGNOSIS — I48.0 PAF (PAROXYSMAL ATRIAL FIBRILLATION): Chronic | ICD-10-CM

## 2024-09-09 LAB
ALBUMIN SERPL BCP-MCNC: 3.5 G/DL (ref 3.5–5.2)
ALP SERPL-CCNC: 54 U/L (ref 55–135)
ALT SERPL W/O P-5'-P-CCNC: 20 U/L (ref 10–44)
ANION GAP SERPL CALC-SCNC: 7 MMOL/L (ref 8–16)
AST SERPL-CCNC: 24 U/L (ref 10–40)
BILIRUB SERPL-MCNC: 0.6 MG/DL (ref 0.1–1)
BUN SERPL-MCNC: 16 MG/DL (ref 8–23)
CALCIUM SERPL-MCNC: 9.6 MG/DL (ref 8.7–10.5)
CHLORIDE SERPL-SCNC: 102 MMOL/L (ref 95–110)
CO2 SERPL-SCNC: 32 MMOL/L (ref 23–29)
CREAT SERPL-MCNC: 0.9 MG/DL (ref 0.5–1.4)
EST. GFR  (NO RACE VARIABLE): >60 ML/MIN/1.73 M^2
GLUCOSE SERPL-MCNC: 106 MG/DL (ref 70–110)
HGB BLD-MCNC: 17.2 G/DL (ref 14–18)
POTASSIUM SERPL-SCNC: 4.7 MMOL/L (ref 3.5–5.1)
PROT SERPL-MCNC: 6.8 G/DL (ref 6–8.4)
SODIUM SERPL-SCNC: 141 MMOL/L (ref 136–145)

## 2024-09-09 PROCEDURE — 36415 COLL VENOUS BLD VENIPUNCTURE: CPT | Mod: PO | Performed by: INTERNAL MEDICINE

## 2024-09-09 PROCEDURE — 80053 COMPREHEN METABOLIC PANEL: CPT | Performed by: INTERNAL MEDICINE

## 2024-09-09 PROCEDURE — 85018 HEMOGLOBIN: CPT | Performed by: INTERNAL MEDICINE

## 2024-09-10 DIAGNOSIS — I48.0 PAF (PAROXYSMAL ATRIAL FIBRILLATION): ICD-10-CM

## 2024-09-10 RX ORDER — RIVAROXABAN 20 MG/1
TABLET, FILM COATED ORAL
Qty: 90 TABLET | Refills: 3 | Status: SHIPPED | OUTPATIENT
Start: 2024-09-10

## 2024-09-16 ENCOUNTER — OFFICE VISIT (OUTPATIENT)
Dept: CARDIOLOGY | Facility: CLINIC | Age: 74
End: 2024-09-16
Payer: MEDICARE

## 2024-09-16 VITALS
SYSTOLIC BLOOD PRESSURE: 100 MMHG | HEART RATE: 74 BPM | BODY MASS INDEX: 37.5 KG/M2 | DIASTOLIC BLOOD PRESSURE: 66 MMHG | WEIGHT: 267.88 LBS | HEIGHT: 71 IN

## 2024-09-16 DIAGNOSIS — I25.10 MILD CAD: Chronic | ICD-10-CM

## 2024-09-16 DIAGNOSIS — R94.30 ELEVATED LEFT VENTRICULAR END-DIASTOLIC PRESSURE (LVEDP): Primary | Chronic | ICD-10-CM

## 2024-09-16 DIAGNOSIS — E66.01 SEVERE OBESITY (BMI 35.0-39.9) WITH COMORBIDITY: Chronic | ICD-10-CM

## 2024-09-16 DIAGNOSIS — I10 ESSENTIAL HYPERTENSION: Chronic | ICD-10-CM

## 2024-09-16 DIAGNOSIS — I87.2 VENOUS INSUFFICIENCY OF BOTH LOWER EXTREMITIES: Chronic | ICD-10-CM

## 2024-09-16 DIAGNOSIS — Z79.01 LONG TERM (CURRENT) USE OF ANTICOAGULANTS: Chronic | ICD-10-CM

## 2024-09-16 DIAGNOSIS — I48.0 PAF (PAROXYSMAL ATRIAL FIBRILLATION): Chronic | ICD-10-CM

## 2024-09-16 PROCEDURE — 3288F FALL RISK ASSESSMENT DOCD: CPT | Mod: CPTII,S$GLB,, | Performed by: INTERNAL MEDICINE

## 2024-09-16 PROCEDURE — 1159F MED LIST DOCD IN RCRD: CPT | Mod: CPTII,S$GLB,, | Performed by: INTERNAL MEDICINE

## 2024-09-16 PROCEDURE — 3074F SYST BP LT 130 MM HG: CPT | Mod: CPTII,S$GLB,, | Performed by: INTERNAL MEDICINE

## 2024-09-16 PROCEDURE — 99214 OFFICE O/P EST MOD 30 MIN: CPT | Mod: S$GLB,,, | Performed by: INTERNAL MEDICINE

## 2024-09-16 PROCEDURE — 99999 PR PBB SHADOW E&M-EST. PATIENT-LVL III: CPT | Mod: PBBFAC,,, | Performed by: INTERNAL MEDICINE

## 2024-09-16 PROCEDURE — 3078F DIAST BP <80 MM HG: CPT | Mod: CPTII,S$GLB,, | Performed by: INTERNAL MEDICINE

## 2024-09-16 PROCEDURE — 1101F PT FALLS ASSESS-DOCD LE1/YR: CPT | Mod: CPTII,S$GLB,, | Performed by: INTERNAL MEDICINE

## 2024-09-16 NOTE — PROGRESS NOTES
Subjective:    Patient ID:  Yonathan Hernandez Sr. is a 73 y.o. 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 ANOTHER 1 WITH A NURSE PRACTITIONER TOLD HIM THAT HE MIGHT HAVE SIDE EFFECTS, PATIENT WAS TAKING THEM BEFORE NO SIDE EFFECTS, SEE ROS    Past Medical History:   Diagnosis Date    Asthma     Atrial fibrillation     Edema     Enlarged prostate      Past Surgical History:   Procedure Laterality Date    ANGIOGRAM, CORONARY, WITH LEFT HEART CATHETERIZATION  10/1/2019    Procedure: Angiogram, Coronary, with Left Heart Cath;  Surgeon: Mica Tafoya MD;  Location: [facility] CATH;  Service: Cardiology;;    CARDIAC CATHETERIZATION       Family History   Problem Relation Name Age of Onset    Lung cancer Father       Social History     Socioeconomic History    Marital status:    Tobacco Use    Smoking status: Never    Smokeless tobacco: Never   Substance and Sexual Activity    Alcohol use: Not Currently    Drug use: Not Currently     Social Determinants of Health     Food Insecurity: No Food Insecurity (4/14/2024)    Hunger Vital Sign     Worried About Running Out of Food in the Last Year: Never true     Ran Out of Food in the Last Year: Never true   Transportation Needs: No Transportation Needs (4/14/2024)    PRAPARE - Transportation     Lack of Transportation (Medical): No     Lack of Transportation (Non-Medical): No   Housing Stability: Low Risk  (4/14/2024)    Housing Stability Vital Sign     Unable to Pay for Housing in the Last Year: No     Homeless in the Last Year: No       Review of patient's allergies indicates:   Allergen Reactions    Cpm-pseudoephed-acetaminophen Other (See Comments)     wheezing    Lidocaine hcl        Current  Outpatient Medications:     azelastine (ASTELIN) 137 mcg (0.1 %) nasal spray, 1 spray (137 mcg total) by Nasal route 2 (two) times daily., Disp: 30 mL, Rfl: 3    budesonide-glycopyr-formoterol (BREZTRI AEROSPHERE) 160-9-4.8 mcg/actuation HFAA, Inhale 2 puffs into the lungs 2 (two) times daily., Disp: 10.7 g, Rfl: 6    clindamycin (CLEOCIN T) 1 % external solution, Apply topically 2 (two) times daily as needed., Disp: , Rfl:     clotrimazole-betamethasone 1-0.05% (LOTRISONE) cream, APPLY TOPICALLY TO THE AFFECTED AND SURROUNDING AREAS TWICE DAILY IN THE MORNING AND IN THE EVENING, Disp: , Rfl:     fluticasone propionate (FLONASE) 50 mcg/actuation nasal spray, 1 spray (50 mcg total) by Each Nostril route once daily., Disp: 11.1 mL, Rfl: 0    furosemide (LASIX) 20 MG tablet, Take 20 mg by mouth once daily., Disp: , Rfl:     levocetirizine (XYZAL) 5 MG tablet, Take 5 mg by mouth., Disp: , Rfl:     potassium chloride SA (K-DUR,KLOR-CON) 10 MEQ tablet, TAKE 1 TABLET BY MOUTH EVERY OTHER DAY, Disp: 90 tablet, Rfl: 1    sotaloL (BETAPACE) 120 MG Tab, Take 1 tablet (120 mg total) by mouth every 12 (twelve) hours., Disp: 180 tablet, Rfl: 1    spironolactone (ALDACTONE) 25 MG tablet, Take 0.5 tablets (12.5 mg total) by mouth once daily., Disp: 45 tablet, Rfl: 0    tamsulosin (FLOMAX) 0.4 mg Cp24, TK 1 C PO QD, Disp: , Rfl: 1    XARELTO 20 mg Tab, TAKE 1 TABLET BY MOUTH EVERY DAY WITH THE EVENING MEAL, Disp: 90 tablet, Rfl: 3    albuterol (VENTOLIN HFA) 90 mcg/actuation inhaler, Inhale 1-2 puffs into the lungs every 4 (four) hours as needed for Wheezing or Shortness of Breath (chest tightness). Rescue (Patient not taking: Reported on 9/16/2024), Disp: 18 g, Rfl: 11    albuterol-ipratropium (DUO-NEB) 2.5 mg-0.5 mg/3 mL nebulizer solution, Take 3 mLs by nebulization every 6 (six) hours as needed for Wheezing or Shortness of Breath. Rescue (Patient not taking: Reported on 9/16/2024), Disp: 75 mL, Rfl: 0     doxycycline (VIBRA-TABS) 100 MG tablet, Take 1 tablet (100 mg total) by mouth 2 (two) times daily. (Patient not taking: Reported on 9/16/2024), Disp: 10 tablet, Rfl: 0    guaiFENesin-codeine 100-10 mg/5 ml (TUSSI-ORGANIDIN NR)  mg/5 mL syrup, Take 5 mLs by mouth every 6 (six) hours as needed for Cough. (Patient not taking: Reported on 8/28/2024), Disp: , Rfl:     montelukast (SINGULAIR) 10 mg tablet, Take 1 tablet (10 mg total) by mouth every evening. (Patient not taking: Reported on 9/16/2024), Disp: 30 tablet, Rfl: 6    pravastatin (PRAVACHOL) 10 MG tablet, Take 1 tablet (10 mg total) by mouth once daily. (Patient not taking: Reported on 8/28/2024), Disp: 90 tablet, Rfl: 1    Review of Systems   Constitutional: Negative for chills, diaphoresis, fever, malaise/fatigue, night sweats and weight gain.   HENT:  Negative for congestion (SINUS) and hoarse voice.    Eyes:  Negative for blurred vision and visual disturbance.   Cardiovascular:  Negative for chest pain, claudication, cyanosis, dyspnea on exertion (MILD, HEAVY LIFTING), irregular heartbeat, leg swelling, near-syncope, orthopnea, palpitations (RARE), paroxysmal nocturnal dyspnea and syncope.   Respiratory:  Negative for cough (SOME), hemoptysis, shortness of breath and wheezing.    Hematologic/Lymphatic: Negative for adenopathy. Does not bruise/bleed easily.   Skin:  Negative for color change and rash.   Musculoskeletal:  Negative for back pain (CHRONIC), falls and joint pain. Muscle weakness: LEGS FROM BACK.  Gastrointestinal:  Positive for heartburn. Negative for abdominal pain, change in bowel habit, dysphagia, jaundice, melena and nausea.   Genitourinary:  Negative for dysuria and flank pain.   Neurological:  Negative for brief paralysis, focal weakness, headaches, light-headedness, loss of balance, paresthesias and weakness.   Psychiatric/Behavioral:  Negative for altered mental status and depression.    Allergic/Immunologic: Negative for hives  "and persistent infections.        Objective:      Vitals:    09/16/24 1146   BP: 100/66   Pulse: 74   Weight: 121.5 kg (267 lb 13.7 oz)   Height: 5' 11" (1.803 m)     Body mass index is 37.36 kg/m².    Physical Exam  Constitutional:       Appearance: He is well-developed. He is obese.   HENT:      Head: Normocephalic and atraumatic.   Eyes:      General: No scleral icterus.     Extraocular Movements: Extraocular movements intact.      Conjunctiva/sclera: Conjunctivae normal.   Neck:      Vascular: Normal carotid pulses. No JVD.   Cardiovascular:      Rate and Rhythm: Normal rate and regular rhythm. No extrasystoles are present.     Pulses:           Carotid pulses are 2+ on the right side and 2+ on the left side.       Radial pulses are 2+ on the right side and 2+ on the left side.        Posterior tibial pulses are 2+ on the right side and 2+ on the left side.      Heart sounds: Murmur heard.      Systolic murmur is present with a grade of 1/6 at the lower left sternal border.      No gallop.   Pulmonary:      Effort: Pulmonary effort is normal.      Breath sounds: Normal breath sounds and air entry.   Abdominal:      Palpations: Abdomen is soft. There is no hepatomegaly.      Tenderness: There is no abdominal tenderness.   Musculoskeletal:         General: Normal range of motion.      Cervical back: Neck supple.      Right lower leg: No edema.      Left lower leg: No edema.   Skin:     General: Skin is warm and dry.      Capillary Refill: Capillary refill takes less than 2 seconds.   Neurological:      General: No focal deficit present.      Mental Status: He is alert and oriented to person, place, and time.      Cranial Nerves: No cranial nerve deficit.   Psychiatric:         Mood and Affect: Mood normal.         Speech: Speech normal.         Behavior: Behavior normal.               ..    Chemistry        Component Value Date/Time     09/09/2024 1005    K 4.7 09/09/2024 1005     09/09/2024 1005    " CO2 32 (H) 09/09/2024 1005    BUN 16 09/09/2024 1005    CREATININE 0.9 09/09/2024 1005     09/09/2024 1005        Component Value Date/Time    CALCIUM 9.6 09/09/2024 1005    ALKPHOS 54 (L) 09/09/2024 1005    AST 24 09/09/2024 1005    ALT 20 09/09/2024 1005    BILITOT 0.6 09/09/2024 1005    ESTGFRAFRICA >60.0 06/02/2022 1100    EGFRNONAA >60.0 06/02/2022 1100            ..  Lab Results   Component Value Date    CHOL 154 02/27/2024    CHOL 137 12/02/2020     Lab Results   Component Value Date    HDL 38 (L) 02/27/2024    HDL 39 (L) 12/02/2020     Lab Results   Component Value Date    LDLCALC 87.4 02/27/2024    LDLCALC 64.2 12/02/2020     Lab Results   Component Value Date    TRIG 143 02/27/2024    TRIG 169 (H) 12/02/2020     Lab Results   Component Value Date    CHOLHDL 24.7 02/27/2024    CHOLHDL 28.5 12/02/2020     ..  Lab Results   Component Value Date    WBC 13.55 (H) 04/17/2024    HGB 17.2 09/09/2024    HCT 51.4 04/17/2024    MCV 90 04/17/2024     04/17/2024       Test(s) Reviewed  I have reviewed the following in detail:  [] Stress test   [] Angiography   [x] Echocardiogram   [x] Labs   [] Other:       Assessment:         ICD-10-CM ICD-9-CM   1. Elevated left ventricular end-diastolic pressure (LVEDP)  R94.30 794.30   2. Venous insufficiency of both lower extremities  I87.2 459.81   3. PAF (paroxysmal atrial fibrillation)  I48.0 427.31   4. Mild CAD  I25.10 414.00   5. Essential hypertension  I10 401.9   6. Long term (current) use of anticoagulants  Z79.01 V58.61   7. Severe obesity (BMI 35.0-39.9) with comorbidity  E66.01 278.01     Problem List Items Addressed This Visit          Cardiac/Vascular    Venous insufficiency of both lower extremities (Chronic)    Relevant Orders    CV US Lower Extremity Veins Bilateral Insufficiency    PAF (paroxysmal atrial fibrillation)    Essential hypertension    Relevant Orders    CV US Lower Extremity Veins Bilateral Insufficiency    Ankle Brachial Indices (RUSTY)     Elevated left ventricular end-diastolic pressure (LVEDP) - Primary    Mild CAD       Hematology    Long term (current) use of anticoagulants       Endocrine    Severe obesity (BMI 35.0-39.9) with comorbidity        Plan:     NIGHTLY PRAVACHOL, LONG DISCUSSION WITH THE PATIENT ABOUT RISK FACTORS MODIFICATION, CHECK VENOUS ULTRASOUND FOR VENOUS INSUFFICIENCY, CLINICALLY NO OVERT HEART FAILURE NO CLINICAL ARRHYTHMIA DIET EXERCISE WEIGHT LOSS RETURN TO CLINIC IN 9 MO      Elevated left ventricular end-diastolic pressure (LVEDP)    Venous insufficiency of both lower extremities  -     CV US Lower Extremity Veins Bilateral Insufficiency; Future    PAF (paroxysmal atrial fibrillation)    Mild CAD    Essential hypertension  -     CV US Lower Extremity Veins Bilateral Insufficiency; Future  -     Ankle Brachial Indices (RUSTY); Future    Long term (current) use of anticoagulants    Severe obesity (BMI 35.0-39.9) with comorbidity    RTC Low level/low impact aerobic exercise 5x's/wk. Heart healthy diet and risk factor modification.    See labs and med orders.    Aerobic exercise 5x's/wk. Heart healthy diet and risk factor modification.    See labs and med orders.

## 2024-09-23 ENCOUNTER — HOSPITAL ENCOUNTER (OUTPATIENT)
Dept: CARDIOLOGY | Facility: HOSPITAL | Age: 74
Discharge: HOME | End: 2024-09-23
Attending: INTERNAL MEDICINE
Payer: MEDICARE

## 2024-09-23 DIAGNOSIS — I10 ESSENTIAL HYPERTENSION: Chronic | ICD-10-CM

## 2024-09-23 DIAGNOSIS — I87.2 VENOUS INSUFFICIENCY OF BOTH LOWER EXTREMITIES: Chronic | ICD-10-CM

## 2024-09-23 LAB
LEFT ABI: 0.9
LEFT ARM BP: 135 MMHG
LEFT DORSALIS PEDIS: 132 MMHG
LEFT GREAT SAPHENOUS DISTAL THIGH DIA: 0.87 CM
LEFT GREAT SAPHENOUS DISTAL THIGH REFLUX: 5777 MS
LEFT GREAT SAPHENOUS JUNCTION DIA: 0.92 CM
LEFT GREAT SAPHENOUS JUNCTION REFLUX: 588 MS
LEFT GREAT SAPHENOUS KNEE DIA: 0.62 CM
LEFT GREAT SAPHENOUS KNEE REFLUX: 6622 MS
LEFT GREAT SAPHENOUS MIDDLE THIGH DIA: 0.92 CM
LEFT GREAT SAPHENOUS PROXIMAL CALF DIA: 0.38 CM
LEFT GREAT SAPHENOUS PROXIMAL CALF REFLUX: 2558 MS
LEFT POSTERIOR TIBIAL: 133 MMHG
LEFT SMALL SAPHENOUS KNEE DIA: 0.37 CM
LEFT SMALL SAPHENOUS SPJ DIA: 0.36 CM
LEFT SMALL SAPHENOUS SPJ REFLUX: 1833 MS
RIGHT ABI: 1.05
RIGHT ARM BP: 148 MMHG
RIGHT DORSALIS PEDIS: 145 MMHG
RIGHT GREAT SAPHENOUS DISTAL THIGH DIA: 0.52 CM
RIGHT GREAT SAPHENOUS DISTAL THIGH REFLUX: 1994 MS
RIGHT GREAT SAPHENOUS JUNCTION DIA: 1.17 CM
RIGHT GREAT SAPHENOUS JUNCTION REFLUX: 2649 MS
RIGHT GREAT SAPHENOUS KNEE DIA: 0.6 CM
RIGHT GREAT SAPHENOUS KNEE REFLUX: 4171 MS
RIGHT GREAT SAPHENOUS MIDDLE THIGH DIA: 0.67 CM
RIGHT GREAT SAPHENOUS PROXIMAL CALF DIA: 0.45 CM
RIGHT GREAT SAPHENOUS PROXIMAL CALF REFLUX: 2284 MS
RIGHT POSTERIOR TIBIAL: 155 MMHG
RIGHT SMALL SAPHENOUS KNEE DIA: 0.5 CM
RIGHT SMALL SAPHENOUS SPJ DIA: 0.3 CM
RIGHT SMALL SAPHENOUS SPJ REFLUX: 3189 MS

## 2024-09-23 PROCEDURE — 93922 UPR/L XTREMITY ART 2 LEVELS: CPT | Mod: PO

## 2024-09-23 PROCEDURE — 93970 EXTREMITY STUDY: CPT | Mod: 26,,, | Performed by: INTERNAL MEDICINE

## 2024-09-23 PROCEDURE — 93922 UPR/L XTREMITY ART 2 LEVELS: CPT | Mod: 26,,, | Performed by: INTERNAL MEDICINE

## 2024-09-23 PROCEDURE — 93970 EXTREMITY STUDY: CPT | Mod: TC,PO

## 2025-02-14 DIAGNOSIS — I48.0 PAF (PAROXYSMAL ATRIAL FIBRILLATION): Chronic | ICD-10-CM

## 2025-02-14 RX ORDER — SOTALOL HYDROCHLORIDE 120 MG/1
TABLET ORAL
Qty: 180 TABLET | Refills: 0 | Status: SHIPPED | OUTPATIENT
Start: 2025-02-14

## 2025-04-07 ENCOUNTER — OFFICE VISIT (OUTPATIENT)
Dept: CARDIOLOGY | Facility: CLINIC | Age: 75
End: 2025-04-07
Payer: MEDICARE

## 2025-04-07 VITALS
DIASTOLIC BLOOD PRESSURE: 71 MMHG | HEIGHT: 71 IN | SYSTOLIC BLOOD PRESSURE: 123 MMHG | HEART RATE: 71 BPM | WEIGHT: 270.06 LBS | BODY MASS INDEX: 37.81 KG/M2

## 2025-04-07 DIAGNOSIS — R94.30 ELEVATED LEFT VENTRICULAR END-DIASTOLIC PRESSURE (LVEDP): ICD-10-CM

## 2025-04-07 DIAGNOSIS — I34.0 NON-RHEUMATIC MITRAL REGURGITATION: Chronic | ICD-10-CM

## 2025-04-07 DIAGNOSIS — I50.32 CHRONIC DIASTOLIC HEART FAILURE: Primary | Chronic | ICD-10-CM

## 2025-04-07 DIAGNOSIS — E66.01 SEVERE OBESITY (BMI 35.0-39.9) WITH COMORBIDITY: Chronic | ICD-10-CM

## 2025-04-07 DIAGNOSIS — Z79.01 LONG TERM (CURRENT) USE OF ANTICOAGULANTS: ICD-10-CM

## 2025-04-07 DIAGNOSIS — I87.2 VENOUS INSUFFICIENCY OF BOTH LOWER EXTREMITIES: Chronic | ICD-10-CM

## 2025-04-07 DIAGNOSIS — I10 ESSENTIAL HYPERTENSION: Chronic | ICD-10-CM

## 2025-04-07 DIAGNOSIS — I25.10 MILD CAD: Chronic | ICD-10-CM

## 2025-04-07 DIAGNOSIS — Z79.899 LONG TERM CURRENT USE OF ANTIARRHYTHMIC DRUG: ICD-10-CM

## 2025-04-07 DIAGNOSIS — I48.0 PAF (PAROXYSMAL ATRIAL FIBRILLATION): Chronic | ICD-10-CM

## 2025-04-07 PROCEDURE — 1126F AMNT PAIN NOTED NONE PRSNT: CPT | Mod: CPTII,S$GLB,, | Performed by: INTERNAL MEDICINE

## 2025-04-07 PROCEDURE — 99999 PR PBB SHADOW E&M-EST. PATIENT-LVL III: CPT | Mod: PBBFAC,,, | Performed by: INTERNAL MEDICINE

## 2025-04-07 PROCEDURE — 1159F MED LIST DOCD IN RCRD: CPT | Mod: CPTII,S$GLB,, | Performed by: INTERNAL MEDICINE

## 2025-04-07 PROCEDURE — 99214 OFFICE O/P EST MOD 30 MIN: CPT | Mod: S$GLB,,, | Performed by: INTERNAL MEDICINE

## 2025-04-07 PROCEDURE — 3078F DIAST BP <80 MM HG: CPT | Mod: CPTII,S$GLB,, | Performed by: INTERNAL MEDICINE

## 2025-04-07 PROCEDURE — 3074F SYST BP LT 130 MM HG: CPT | Mod: CPTII,S$GLB,, | Performed by: INTERNAL MEDICINE

## 2025-04-07 PROCEDURE — 1101F PT FALLS ASSESS-DOCD LE1/YR: CPT | Mod: CPTII,S$GLB,, | Performed by: INTERNAL MEDICINE

## 2025-04-07 PROCEDURE — 3288F FALL RISK ASSESSMENT DOCD: CPT | Mod: CPTII,S$GLB,, | Performed by: INTERNAL MEDICINE

## 2025-04-07 RX ORDER — SPIRONOLACTONE 25 MG/1
12.5 TABLET ORAL DAILY
Qty: 45 TABLET | Refills: 1 | Status: SHIPPED | OUTPATIENT
Start: 2025-04-07 | End: 2026-04-07

## 2025-04-07 RX ORDER — POTASSIUM CHLORIDE 20 MEQ/1
1 TABLET, EXTENDED RELEASE ORAL DAILY
COMMUNITY

## 2025-04-09 ENCOUNTER — HOSPITAL ENCOUNTER (OUTPATIENT)
Dept: CARDIOLOGY | Facility: HOSPITAL | Age: 75
Discharge: HOME | End: 2025-04-09
Attending: INTERNAL MEDICINE
Payer: MEDICARE

## 2025-04-09 DIAGNOSIS — Z79.899 LONG TERM CURRENT USE OF ANTIARRHYTHMIC DRUG: ICD-10-CM

## 2025-04-09 DIAGNOSIS — I48.0 PAF (PAROXYSMAL ATRIAL FIBRILLATION): Chronic | ICD-10-CM

## 2025-04-09 PROCEDURE — 93242 EXT ECG>48HR<7D RECORDING: CPT | Mod: PO

## 2025-05-23 ENCOUNTER — RESULTS FOLLOW-UP (OUTPATIENT)
Dept: CARDIOLOGY | Facility: CLINIC | Age: 75
End: 2025-05-23